# Patient Record
Sex: FEMALE | Race: WHITE | NOT HISPANIC OR LATINO | Employment: FULL TIME | URBAN - METROPOLITAN AREA
[De-identification: names, ages, dates, MRNs, and addresses within clinical notes are randomized per-mention and may not be internally consistent; named-entity substitution may affect disease eponyms.]

---

## 2022-06-23 ENCOUNTER — TRANSFERRED RECORDS (OUTPATIENT)
Dept: HEALTH INFORMATION MANAGEMENT | Facility: CLINIC | Age: 52
End: 2022-06-23

## 2022-06-24 ENCOUNTER — TRANSFERRED RECORDS (OUTPATIENT)
Dept: HEALTH INFORMATION MANAGEMENT | Facility: CLINIC | Age: 52
End: 2022-06-24

## 2022-06-28 ENCOUNTER — MEDICAL CORRESPONDENCE (OUTPATIENT)
Dept: HEALTH INFORMATION MANAGEMENT | Facility: CLINIC | Age: 52
End: 2022-06-28

## 2022-06-29 ENCOUNTER — DOCUMENTATION ONLY (OUTPATIENT)
Dept: ONCOLOGY | Facility: CLINIC | Age: 52
End: 2022-06-29

## 2022-06-29 ENCOUNTER — TRANSCRIBE ORDERS (OUTPATIENT)
Dept: OTHER | Age: 52
End: 2022-06-29

## 2022-06-29 DIAGNOSIS — R89.9 ABNORMAL THYROID BIOPSY: Primary | ICD-10-CM

## 2022-06-29 NOTE — PROGRESS NOTES
Action June 29, 2022 5:24 PM ABT   Action Taken Records from OGI received and sent to HIM for upload

## 2022-06-30 ENCOUNTER — TRANSCRIBE ORDERS (OUTPATIENT)
Dept: OTHER | Age: 52
End: 2022-06-30

## 2022-06-30 DIAGNOSIS — R89.9 ABNORMAL THYROID BIOPSY: Primary | ICD-10-CM

## 2022-07-01 NOTE — TELEPHONE ENCOUNTER
FUTURE VISIT INFORMATION      FUTURE VISIT INFORMATION:    Date: 22    Time: 8:20AM    [place] [facility] [place] INFORMATION:    Referring provider: Kiki Mcqueen MD    Referring providers clinic:  [facility]    Reason for visit/diagnosis  Abnormal thyroid biopsy [R89.9] referred by Dr. Kiki Mcqueen  [facility]    RECORDS REQUESTED FROM:       Clinic name Comments Records Status Imaging Status   [facility] 22 note from Kiki Mcqueen MD Scanned in [facility]  22 US FNA  Scanned in Epic req 22 - PACS   [facility]  22 US Thyroid   10/5/21 MRI BRAIN  Care everywhere re 22 - [facility], Central Laboratory - [address], [place] [address]   Phone: [phone] 22  Right Thyroid  (Case: [id number])    *trackin Care everywhere     Path req 22 2:57PM sent a fax to [place] rad and [facility] rad for images, [facility] for path send out - Amay   22 images received in PACS - Amay

## 2022-07-07 NOTE — TELEPHONE ENCOUNTER
Action July 7, 2022 4:09 PM ABT   Action Taken Slides 06/23/22: [id number] from [facility] received and taken to 5th floor path lab for review

## 2022-07-11 ENCOUNTER — LAB REQUISITION (OUTPATIENT)
Dept: LAB | Facility: CLINIC | Age: 52
End: 2022-07-11
Payer: COMMERCIAL

## 2022-07-11 PROCEDURE — 88321 CONSLTJ&REPRT SLD PREP ELSWR: CPT | Mod: GC | Performed by: PATHOLOGY

## 2022-07-12 LAB
PATH REPORT.COMMENTS IMP SPEC: NORMAL
PATH REPORT.COMMENTS IMP SPEC: NORMAL
PATH REPORT.FINAL DX SPEC: NORMAL
PATH REPORT.GROSS SPEC: NORMAL
PATH REPORT.MICROSCOPIC SPEC OTHER STN: NORMAL
PATH REPORT.RELEVANT HX SPEC: NORMAL
PATH REPORT.RELEVANT HX SPEC: NORMAL
PATH REPORT.SITE OF ORIGIN SPEC: NORMAL

## 2022-07-18 ENCOUNTER — OFFICE VISIT (OUTPATIENT)
Dept: OTOLARYNGOLOGY | Facility: CLINIC | Age: 52
End: 2022-07-18
Payer: COMMERCIAL

## 2022-07-18 ENCOUNTER — PRE VISIT (OUTPATIENT)
Dept: OTOLARYNGOLOGY | Facility: CLINIC | Age: 52
End: 2022-07-18

## 2022-07-18 ENCOUNTER — ANCILLARY PROCEDURE (OUTPATIENT)
Dept: ULTRASOUND IMAGING | Facility: CLINIC | Age: 52
End: 2022-07-18
Attending: OTOLARYNGOLOGY
Payer: COMMERCIAL

## 2022-07-18 VITALS
DIASTOLIC BLOOD PRESSURE: 73 MMHG | HEIGHT: 66 IN | BODY MASS INDEX: 27.32 KG/M2 | WEIGHT: 170 LBS | HEART RATE: 76 BPM | SYSTOLIC BLOOD PRESSURE: 116 MMHG

## 2022-07-18 DIAGNOSIS — C73 MALIGNANT NEOPLASM OF THYROID GLAND (H): ICD-10-CM

## 2022-07-18 DIAGNOSIS — C73 MALIGNANT NEOPLASM OF THYROID GLAND (H): Primary | ICD-10-CM

## 2022-07-18 PROCEDURE — 76536 US EXAM OF HEAD AND NECK: CPT | Mod: GC | Performed by: RADIOLOGY

## 2022-07-18 PROCEDURE — 99205 OFFICE O/P NEW HI 60 MIN: CPT | Mod: 25 | Performed by: OTOLARYNGOLOGY

## 2022-07-18 PROCEDURE — 31575 DIAGNOSTIC LARYNGOSCOPY: CPT | Performed by: OTOLARYNGOLOGY

## 2022-07-18 RX ORDER — LISINOPRIL 10 MG/1
TABLET ORAL
COMMUNITY
Start: 2022-06-20

## 2022-07-18 RX ORDER — METOPROLOL SUCCINATE 50 MG/1
TABLET, EXTENDED RELEASE ORAL
COMMUNITY
Start: 2022-06-20

## 2022-07-18 ASSESSMENT — PAIN SCALES - GENERAL: PAINLEVEL: NO PAIN (0)

## 2022-07-18 NOTE — LETTER
2022       RE: Miriam Beverly  [address]  [facility] [address]     Dear Colleague,    Thank you for referring your patient, Miriam Beverly, to the [facility] [place] [facility]. Please see a copy of my visit note below.    Dear Dr. Mcqueen:    I had the pleasure of meeting Miriam Beverly in consultation today at the [facility] at your request.     History of Present Illness:   Miriam Beverly is a 51 year old woman referred for evaluation of neck mass. The patient saw her Ob/gyn in 2022 and noted a mass of the neck to the right of midline. The mass had been present for about 2 months prior to presentation.    She had a thyroid U/S on 2022 which showed the right lobe measured 5.4 x 3.0 x 2.1 cm, several small cysts up to 1.3 cm, TIRADS 4 nodule measuring 3.5 x 2.6 x 2.1 (report says 8.5 cm but impression indicates 3.5 cm), left lobe measured 5.6 x 1.3 x 1.0 cm with small cysts measuring up to 5 mm.     She had a thyroid biopsy on 2022 which was suspicious for papillary thyroid cancer.    She had a CT scan by cardiology which showed pulmonary nodules. She is seeing primary care for this on Friday.     TSH normal in 2022.    She has seen a surgeon through [facility].  She says that she had a dedicated neck ultrasound performed so it sounds like it was done of the anterior neck and perhaps not the lateral neck.    She is not a fermin. She has no issues with swallowing. She thinks her voice is more raspy. She has no breathing issues.       Past medical history: anxiety, asthma, hypertension, HPV    Past surgical history: breast augmentation, , colposcopy, wisdom tooth extraction (no bleeding or anesthesia issues)    Social history: Works for non-profit. Occasional alcohol use. . Nonsmoker. Live with partner.    Family history: No thyroid cancer  history. Grandmother with breast cancer.    MEDICATIONS:     Current Outpatient Medications   Medication Sig Dispense Refill     lisinopril (ZESTRIL) 10 MG tablet TAKE 1 TABLET(10 MG) BY MOUTH EVERY DAY       metoprolol succinate ER (TOPROL XL) 50 MG 24 hr tablet TAKE 1 TABLET(50 MG) BY MOUTH EVERY DAY         ALLERGIES:  No Known Allergies    HABITS/SOCIAL HISTORY:   Works for non-profit.   Occasional alcohol use.   .   Nonsmoker.   Live with partner.    Social History     Socioeconomic History     Marital status:      Spouse name: Not on file     Number of children: Not on file     Years of education: Not on file     Highest education level: Not on file   Occupational History     Not on file   Tobacco Use     Smoking status: Not on file     Smokeless tobacco: Not on file   Substance and Sexual Activity     Alcohol use: Not on file     Drug use: Not on file     Sexual activity: Not on file   Other Topics Concern     Not on file   Social History Narrative     Not on file     Social Determinants of Health     Financial Resource Strain: Not on file   Food Insecurity: Not on file   Transportation Needs: Not on file   Physical Activity: Not on file   Stress: Not on file   Social Connections: Not on file   Intimate Partner Violence: Not on file   Housing Stability: Not on file       PAST MEDICAL HISTORY: No past medical history on file.     PAST SURGICAL HISTORY: No past surgical history on file.    FAMILY HISTORY:  No family history on file.    REVIEW OF SYSTEMS:  12 point ROS was negative other than the symptoms noted above in the HPI.  Patient Supplied Answers to Review of Systems   ENT ROS 7/14/2022   Constitutional: Weight loss, Appetite change, Unexplained fatigue   Neurology: Dizzy spells, Headache   Psychology: Frequently feeling anxious   Ears, Nose, Throat: Hoarseness   Cardiopulmonary: Cough   Musculoskeletal: Neck pain   Allergy/Immunology: Allergies or hay fever         PHYSICAL  "EXAMINATION:   /73   Pulse 76   Ht 1.676 m (5' 6\")   Wt 77.1 kg (170 lb)   BMI 27.44 kg/m     Appearance:   normal; NAD, age-appropriate appearance, well-developed, normal habitus   Communication:   normal; communicates verbally, normal voice quality   Head/Face:   inspection -  Normal; no scars or visible lesions   Salivary glands -  Normal size, no tenderness, swelling, or palpable masses   Facial strength -  Normal and symmetric   Skin:  normal, no rash   Ears:  auricle (AD) -  normal  EAC (AD) -  normal  TM (AD) -  Normal, no effusion  auricle (AS) -  normal  EAC (AS) -  normal  TM (AS) -  Normal, no effusion  Normal clinical speech reception   Nose:  Ext. inspection -  Normal  Internal Inspection -  Normal mucosa, septum, and turbinates   Nasopharynx:  normal mucosa, no masses   Oral Cavity:  lips -  Normal mucosa, oral competence, and stoma size   Age-appropriate dentition, healthy gingival mucosa   Hard palate, buccal, floor of mouth muc   Oropharynx:  mucosa -  Normal, no lesions  soft palate -  Normal, no lesions, no asymmetry, normal elevation  tonsils -  Normal, no exudates, no abnormal lesions, symmetric  Base of tongue - normal  Vallecula - clear   Hypopharynx:  Normal pyriform sinus and pharyngeal wall mucosa   No pooled secretions    Larynx:  Epiglottis, AE folds, false vocal cords, true vocal cords, arytenoids normal in appearance, bilaterally mobile cords    Neck: No visible mass or asymmetry   thyroid -  Palpable right thyroid nodule about 3-4 cm in size  Normal range of motion   Lymphatic:  no abnormal nodes   Cardiovascular:  warm, pink, well-perfused extremities without swelling, tenderness, or edema   Respiratory:  Normal respiratory effort, no stridor   Neuro/Psych.:  mood/affect -  normal  mental status -  normal        PROCEDURES:   Flexible fiberoptic laryngoscopy: Scope exam was indicated due to papillary thyroid cancer. Verbal consent was obtained. The nasal cavity was prepped " with an aerosolized solution of topical anesthetic and vasoconstrictive agent. The scope was passed through the anterior nasal cavity and advanced. Inspection of the nasopharynx revealed no gross abnormality. The base of tongue and vallecula are normal. The epiglottis, AE folds, false cords, true cords, arytenoids are normal. Inspection of the larynx revealed bilaterally mobile vocal cords. Pyriform sinuses are symmetric. The airway is patent. Procedure tolerated well with no immediate complications noted.    RESULTS REVIEWED:   Reviewed multiple notes from ob/gyn, TSH, U/s report, path report      IMPRESSION AND PLAN:   Miriam Beverly is a 51 year old woman referred for evaluation of a likely papillary thyroid cancer of the right thyroid lobe. The nodule measured 3.5 cm. Given the size she would actually qualify for a hemithyroidectomy versus total thyroidectomy.  She had an ultrasound performed locally and at least on the report had no lymphadenopathy.  However when I discussed it with her it sounds like it did not include the entire lateral neck.  I discussed my preference to do levels I-VII bilaterally when doing the neck ultrasound since that can significantly change our recommendations if there is nikia disease.    We discussed the surgery in detail.   We discussed hemithyroidectomy versus total thyroidectomy.  We discussed the monitoring difficulties that can be created with a hemithyroidectomy and certainly the inability to get radioactive iodine versus need for second surgery if this is required.    I discussed the planned incision.  We discussed the risks of cosmetic changes and scarring.  We discussed the risk of infection.  We discussed the risk of bleeding which could happen at the time of surgery or in delayed fashion.  We discussed bleeding after surgery can result in need for return trip to the operating room and airway risk.  I had a discussion about the risks of surgery.  We discussed temporary  postoperative dysphagia related to manipulation of the strap muscles.  We discussed the risk to the superior laryngeal nerve and impacts on vocal pitch.  We discussed the risks to the recurrent laryngeal nerve with impacts depending on hemithyroidectomy versus total thyroidectomy.  We discussed with that injury to one side can result in dysphonia and dysphagia, whereas injuries in both sides can result in an airway emergency and need for tracheostomy.  We discussed intraoperative nerve monitoring and if the nerve does not stimulate on one side after performing the right side first, we would not proceed with the left side.  We discussed the risks to the parathyroid glands with the difference between the hemithyroidectomy and total thyroidectomy being potential permanent hypocalcemia with the total thyroidectomy.  We discussed the need for postoperative calcium replacement.  I discussed the role of GLORIA drain placement postoperatively.  We discussed potential need for thyroid replacement depending on the surgery that she proceeds with.    She is interested in meeting with endocrinology.  A consult was placed today.  We will repeat her neck ultrasound here.    She has already met with a surgeon at [facility].  We are happy to be involved in her care however she wishes us to be, our contact information was provided if she wishes to proceed with surgery here.    Thank you very much for the opportunity to participate in the care of your patient.      Sarita Miles MD  Otolaryngology- Head & Neck Surgery      This note was dictated with voice recognition software and then edited. Please excuse any unintentional errors.         CC:  Kiki Mcqueen MD  Ob Gyn and Infertility  [address]  [facility] [address]

## 2022-07-18 NOTE — TELEPHONE ENCOUNTER
APPT NOTES: Malignant Neoplasm of Thyroid Gland, Referred by Dr. Sarita Miles    For Cancer Patients: Need the original operative and surgical pathology reports and all imaging reports/images related to the disease (includes all thyroid US, nuclear thyroid and total body scans, PET scans, chest CT reports since prior to the diagnosis ).   APPT DATE: 8/3/2022   NOTES (FOR ALL VISITS) STATUS DETAILS   OFFICE NOTES from referring provider Internal [facility]:  22 - ENT OV with Dr. Miles   OFFICE NOTES from other specialist Care Everywhere / Received [facility]:  22, 22 - OBGYN OV with Dr. Mcqueen    [facility]:  22 - OBGYN OV with Dr. Mcqueen     ED NOTES N/A    OPERATIVE REPORT  (thyroid, pituitary, adrenal, parathyroid)  (All op notes related to diagnoses) N/A    MEDICATION LIST Internal    IMAGING      MRI (BRAIN) Received [facility]:  10/5/21  MRI Brain   CT (HEAD/NECK/CHEST/ABDOMEN) Received [facility]:  22 - CT Heart Calcium   ULTRASOUND (HEAD/NECK)  * Include FNAs Received / [facility]:  22 - US Head/Neck    [facility]:  22 - US Neck  22 - US Thyroid    [facility]:  22 - US Thyroid FNA   LABS     DIABETES: HBGA1C, CREATININE, FASTING LIPIDS, MICROALBUMIN URINE, POTASSIUM, TSH, T4    THYROID: TSH, T4, CBC, THYRODLONULIN, TOTAL T3, FREE T4, CALCITONIN, CEA Received / Care Everywhere OBGYN:  22 - Estradiol  22 - FSH  22 - HBGA1C (noted in 22 OV)  22 - Lipid (noted in 22 OV)  22 - TSH (noted in 22 OV)    [facility]:  21 - TSH   PATHOLOGY REPORTS WITH CASE NUMBER  *Surgical path reports for endocrine organs (ovaries, testes, pancreas, etc) Care Everywhere   [facility]:  22 - Thyroid FNA (Case: [id number])     Records Requested  22    Facility  [facility] Infertility  Fax: [phone]  [facility]  Fax: [phone]   Outcome * 22 12:03 PM Faxed urg req [facility] MG for images to be  pushed to [facility] PACs. - Bernie    * 7/19/22 11:55 AM Images received from CLARE HAMMER and attached to the patient in PACs. Ely Gibbs

## 2022-07-18 NOTE — NURSING NOTE
"Chief Complaint   Patient presents with     Consult     Abnormal thyroid biopsy       Blood pressure 116/73, pulse 76, height 1.676 m (5' 6\"), weight 77.1 kg (170 lb).    Kylee Moore, EMT    "

## 2022-07-19 ENCOUNTER — PREP FOR PROCEDURE (OUTPATIENT)
Dept: OTOLARYNGOLOGY | Facility: CLINIC | Age: 52
End: 2022-07-19

## 2022-07-19 DIAGNOSIS — C73 PAPILLARY THYROID CARCINOMA (H): Primary | ICD-10-CM

## 2022-07-21 DIAGNOSIS — D32.9 MENINGIOMA (H): Primary | ICD-10-CM

## 2022-07-26 ENCOUNTER — TELEPHONE (OUTPATIENT)
Dept: OTOLARYNGOLOGY | Facility: CLINIC | Age: 52
End: 2022-07-26

## 2022-07-26 NOTE — TELEPHONE ENCOUNTER
Called patient to schedule surgery with Dr. Miles. Patient was offered dates in August. Patient elected to hold off to complete her work deadlines. Patient elected for surgery 3rd week of august and was scheduled for 8/18/2022.    Approximate arrival time given:  No  Location of surgery: [facility] OR  Pre-Op H&P: PCP within 30 days   Post-Op Appt Date: 1 week    Imaging needed:  No  Discussed COVID-19 Testing: Yes     Patient aware that pre-op RN will call 2-3 days prior to surgery with arrival time and instructions Yes     Packet sent out: Yes 07/26/22 - Thyroidectomy teaching was mailed.     All patients questions were answered and was instructed to review surgical packet and call back with any questions or concerns. Knows to call JOSE ALEJANDRO Cortez for any questions or concerns.       Greta Barnes on 7/26/2022 at 11:29 AM

## 2022-07-27 DIAGNOSIS — C73 PAPILLARY THYROID CARCINOMA (H): Primary | ICD-10-CM

## 2022-07-27 DIAGNOSIS — R91.8 PULMONARY NODULES: ICD-10-CM

## 2022-07-31 ENCOUNTER — HEALTH MAINTENANCE LETTER (OUTPATIENT)
Age: 52
End: 2022-07-31

## 2022-08-03 ENCOUNTER — OFFICE VISIT (OUTPATIENT)
Dept: ENDOCRINOLOGY | Facility: CLINIC | Age: 52
End: 2022-08-03
Attending: OTOLARYNGOLOGY
Payer: COMMERCIAL

## 2022-08-03 ENCOUNTER — PRE VISIT (OUTPATIENT)
Dept: ENDOCRINOLOGY | Facility: CLINIC | Age: 52
End: 2022-08-03

## 2022-08-03 VITALS
TEMPERATURE: 97.9 F | BODY MASS INDEX: 27.76 KG/M2 | DIASTOLIC BLOOD PRESSURE: 69 MMHG | SYSTOLIC BLOOD PRESSURE: 100 MMHG | OXYGEN SATURATION: 96 % | RESPIRATION RATE: 18 BRPM | HEART RATE: 83 BPM | WEIGHT: 172 LBS

## 2022-08-03 DIAGNOSIS — C73 MALIGNANT NEOPLASM OF THYROID GLAND (H): ICD-10-CM

## 2022-08-03 DIAGNOSIS — R89.6 ABNORMAL CYTOLOGY: ICD-10-CM

## 2022-08-03 DIAGNOSIS — R91.8 PULMONARY NODULES: ICD-10-CM

## 2022-08-03 DIAGNOSIS — E04.1 THYROID NODULE: Primary | ICD-10-CM

## 2022-08-03 PROCEDURE — 99205 OFFICE O/P NEW HI 60 MIN: CPT

## 2022-08-03 RX ORDER — MULTIVITAMIN
1 TABLET ORAL
COMMUNITY

## 2022-08-03 RX ORDER — LORATADINE 10 MG/1
10 TABLET ORAL DAILY
COMMUNITY

## 2022-08-03 ASSESSMENT — PAIN SCALES - GENERAL: PAINLEVEL: NO PAIN (0)

## 2022-08-03 NOTE — CONFIDENTIAL NOTE
RECORDS STATUS - ALL OTHER DIAGNOSIS      RECORDS RECEIVED FROM: [facility]/ [facility]/ [facility]    DATE RECEIVED: 8/9/2022    Action    Action Taken 8/3/2022 9:39AM KEB   I called [facility]'s IMG Dept Ph: [phone] - CT Chest/Thorax     I called [facility] Ph: [phone] - they will push the CT from July 27th 2022    I called [facility] - they will push the older images to  PACS    I resolved all the images in PACS      NOTES STATUS DETAILS   OFFICE NOTE from referring provider     DISCHARGE SUMMARY from hospital Complete Epic- 8/18/2022    More in [facility]   DISCHARGE REPORT from the ER     OPERATIVE REPORT Complete 7/19/2022 THYROIDECTOMY     MEDICATION LIST Complete  [facility]   CLINICAL TRIAL TREATMENTS TO DATE     LABS     PATHOLOGY REPORTS  7/11/2022 Path Consult   CASE FROM [place], [place] ([id number], OBTAINED 06/23/2022):     Specimen A                  Interpretation:                    Suspicious for papillary thyroid carcinoma      ANYTHING RELATED TO DIAGNOSIS     GENONOMIC TESTING     TYPE:     IMAGING (NEED IMAGES & REPORT)     CT SCANS Complete - [facility]     Complete- [facility]  CT Chest/ Thorax 7/27/2022    CT Heart 6/24/2022     Older imaging in PACS   MRI     MAMMO     ULTRASOUND Complete- [facility] US Neck Soft Tissue 7/13/2022     US Thyroid 6/16/2022   PET

## 2022-08-03 NOTE — PROGRESS NOTES
Endocrine Consult note--     Attending Assessment/Plan :     1.  Thyroid nodules  A) right dominant 3.5 cm nodule with cytology suspicious for papillary thyroid carcinoma. This cytology carries approx 50-85% risk of thyroid cancer, not 100%.  Molecular testing  has not been performed  B) subcm right and left nodules  She has already seen 2 surgeons and is scheduled for surgery with Dr Miles 8/18.   I have conseled her on the above , as well as considerations as it pertains to # 3 and 4.   RTC 8/29     2.  Abnormal cytology suspicious for papillary thyroid carcinoma    4.  Pulmonary nodules noted right lung 6/2022 and 7/22 imaging    4.  Brain masses  A) extraaxial mass presumed meningioma  B) superior cerebellar cistern mass presumed arachnoid cyst    _62_ minutes spent on the date of the encounter doing chart review, history and exam, documentation and further activities as noted above.    Cheyenne Valladares MD    Chief complaint:  Miriam is a 51 year old female seen in consultation at the request of Dr Sarita Miles for papillary thyroid carcinoma.   I have reviewed Care Everywhere including [facility] lab reports, imaging reports and provider notes as indicated.      HISTORY OF PRESENT ILLNESS  Right thyroid enlargement was felt on examination 9/29/2021.  This was later followed by thyroid US and FNAB of the right dominant nodule measuring 3.5 cm.  Cytology was read at both [facility] and [facility] both as suspicious for papillary thyroid carcinoma.   Molecular testing  has not been performed and the fNAB cytology report from [facility] suggests it wasn't collected for prn molecular     She is scheduled for total thyroidctomy 8/10/2022 with Dr Laura Woods. She also saw surgeon Dr Sarita Miles 7/18/2022.  I have reviewed her note.    Surgery is 8/18/2022    Endocrine relevant labs are as follows:  5/16/13 TSH 1.24  9/29/2021 TSh 1.45    Relevant imaging is as follows: (as read by me as it pertains to endocrine  relevant organs)  10/5/2021 brain MRI: pituitary appears normal ; T1 hyperintense mass left  described by radiologist as Homogeneous enhancing extra-axial mass along the left temporal convexity in the middle cranial fossa 11 x 19 x  14 mm, in keeping with a benign meningioma. Mild mass effect on the lateral temporal gyri; I don' see the cerebellar cistern cyst.    22 thyroid US ([facility]):  Right thyroid nodule 2.6 x 2.1 x  3.5 cm hypoechoic heterogeneous -2022 FNAB  Right medial inferior  0.7 x 0.6x 1.2 cystic with small mural solid  Right inf medial  0.8 x 0.5 x 0.9 mixed   Left inferior mid posterior  0.3 x  0.2 x 0.4   Left posterior mid 0.3 x 0.3 x 0.5 hypoechoic  2022 neck US [facility]   2022 neck US  Right level 5 inferior # 1 0.7 x   Left level 3 # 1 0.3 x 0.2 x   Left level 3 # 2 0.4 x 0.6 x 1.1   2022 CT chest ([facility])    REVIEW OF SYSTEMS  Fatigued  Lack of appetite x 2-3 months  Weight loss 20# since 3-6 months ago  Cardiac: occasional heart racing feeling; PVCs have been pretty good for a while.   Respiratory: negative  GI nausea with periods  Anxious person  Stopped ocp 2021- now having periods every 3 weeks   10 system ROS otherwise as per the HPI or negative    Past Medical History  Past Medical History:   Diagnosis Date     Allergy-induced asthma      Arachnoid cyst of posterior cranial fossa 10/05/2021    3 cm,superior cerebellar cistern; presumptive imaging diagnosis     Gestational hypertension      HTN (hypertension)      Hyperlipidemia      Meningioma (H) 10/05/2021    presumptive imaging diagnsois; 14 mm , left temporal convexity     Postpartum cardiomyopathy      Preeclampsia      Pulmonary nodules 2022     PVC's (premature ventricular contractions)      Sinusitis 2017     Past Surgical History:   Procedure Laterality Date      SECTION  2002     MAMMOPLASTY AUGMENTATION  2012     wisdom teeth extraction        Medications  Current Outpatient Medications   Medication     lisinopril (ZESTRIL) 10 MG tablet     loratadine (CLEAR-ATADINE) 10 MG tablet     metoprolol succinate ER (TOPROL XL) 50 MG 24 hr tablet     Multiple Vitamin (ONE-A-DAY ESSENTIAL) TABS     No current facility-administered medications for this visit.       Current Outpatient Medications   Medication Sig Dispense Refill     lisinopril (ZESTRIL) 10 MG tablet TAKE 1 TABLET(10 MG) BY MOUTH EVERY DAY       loratadine (CLEAR-ATADINE) 10 MG tablet Take 10 mg by mouth daily       metoprolol succinate ER (TOPROL XL) 50 MG 24 hr tablet TAKE 1 TABLET(50 MG) BY MOUTH EVERY DAY       Multiple Vitamin (ONE-A-DAY ESSENTIAL) TABS Take 1 tablet by mouth       Allergies  No Known Allergies    Family History  Family History   Problem Relation Age of Onset     Hypertension Mother      Hypertension Father      Breast Cancer Paternal Grandmother      Diabetes Paternal Aunt      Diabetes Paternal Aunt      Thyroid Cancer No family hx of      Thyroid Disease No family hx of      Social History  Social History     Tobacco Use     Smoking status: Never Smoker     Smokeless tobacco: Never Used   Substance Use Topics     Alcohol use: Yes     Comment: occ     Drug use: Never   walks everyday;     Physical Exam  /69   Pulse 83   Temp 97.9  F (36.6  C)   Resp 18   Wt 78 kg (172 lb)   SpO2 96%   BMI 27.76 kg/m    Body mass index is 27.76 kg/m .  GENERAL : pleasant woman in  In no apparent distress  SKIN: Normal color, normal temperature, texture.  .     EYES: PER,  No scleral icterus,  No proptosis, conjunctival redness, stare, retraction  NECK: visible right thyroid bed fullness mass effect approx 3.5 cm.  On palpation if feels smaller, hard, irregular approx 2 ccm   RESP: Lungs clear to auscultation bilaterally  CARDIAC: Regular rate and rhythm, normal S1 S2, without murmurs, rubs or gallops   NEURO: awake, alert, responds appropriately to questions.    Moves all  extremities; Gait normal.  No tremor of the outstretched hand.  DTRs  2 /4 ,   EXTREMITIES: No clubbing, cyanosis or edema.    DATA REVIEW    EXAMINATION: US HEAD NECK SOFT TISSUE 7/18/2022 9:55 AM   COMPARISON: None.  HISTORY: Papillary thyroid cancer assess bilateral neck 1 through 7   TECHNIQUE: Sonographic imaging performed of the neck to evaluate forlymph nodes using grey scale and limited Doppler technique.  FINDINGS:  Lymph nodes are measured bilaterally with measurements given intransverse, AP, and craniocaudal dimensions as follows:  Right:  Level 1: Benign appearing node measuring 0.8 x 0.5 x 0.7 cm  Level 2: Benign-appearing nodes measuring 1.9 x 0.9 x 3.2 cm and 1 x0.3 x 1 cm  Level 5: Benign-appearing node measuring 0.5 x 0.6 x 0.7 cm  Left:  Level 2: Benign-appearing node measuring 1.4 x 0.8 x 2.3 cm  Level 3: Benign-appearing lymph nodes measuring 0.3 x 0.2 x 0.3 cm and 0.6 x 0.4 x 1.1 cm  Partially imaged previously biopsied right thyroid nodule.   IMPRESSION:  Soft tissue neck ultrasound with lymph node measurements as describedabove.  MANNY THOMSON MD        Pathology Consult: [id number]  Order: [id number]   Collected 7/11/2022  1:22 PM     Status: Final result     Visible to patient: Yes (seen)     0 Result Notes    Component    Case Report   Consult Report                                    Case: [id number]                                   Authorizing Provider:  Sarita Miles MD     Collected:           07/11/2022 01:22 PM           Ordering Location:     [facility]     Received:            07/11/2022 01:23 PM                                  [facility]                                                         Pathologist:           Corby Moreira III, MD                                                       Specimen:    Consult Slide, [id number]                                                                  Final Diagnosis   CASE FROM [facility],  [place], [place] ([id number], OBTAINED 06/23/2022):     Specimen A                   Interpretation:                    Suspicious for papillary thyroid carcinoma                Adequacy:                   Satisfactory for evaluation      Electronically signed by Corby Moreira III, MD on 7/12/2022 at 12:24 PM   Original Case ID    [id number]   Material Submitted    11 slides   Clinical Information    The patient is a 51-year-old female with a right mid thyroid nodule (3.5cm)      Gross Description    Received from [facility] in [place], [place] are 11 stained slides labeled [id number] (obtained 06/23/2022), and copies of the referring pathologist's reports with patient identifying information.  All slides are returned.      Microscopic Description    Microscopic examination was performed.     A resident/fellow in an ACGME accredited training program was involved in the initial review, preparation, and/or interpretation of this case.  I, as the senior physician, attest that I: (i) reviewed patient clinical records if indicated; (ii) reviewed relevant lab test results; (iii) examined the relevant preparation(s) for the specimen(s); and (iv) agree with the report, diagnosis(es), and interpretation as documented by the resident/fellow and edited/confirmed by me. Reporting resident/fellow: Rafal Calvillo, PGY5      Performing Labs    The technical component of this testing was completed at [facility]   [facility]              Specimen Collected: 07/11/22  1:22 PM Last Resulted: 07/12/22 12:24 PM                7/27/2022 CT CHEST/THORAX W/O CON    Anatomical Region Laterality Modality   Chest -- Computed Tomography       Impression    IMPRESSION: Redemonstration of multiple clustered nodular and tubular opacities at the extreme anterior/inferior right lung base, grossly similar to the study from one month ago. I suspect this  represents a combination of lung nodules and mucous impacted bronchi. This appearance is more commonly seen with an infectious/inflammatory process than a neoplastic process. Since these have not resolved or decreased, consider pulmonary consultation for further evaluation.   REPORT SIGNED BY DR. Reilly Camacho  Narrative  EXAM: CT CHEST (without contrast)     DATE: 7/27/2022 7:30 AM   COMPARISON: 6/20/2022   CLINICAL DATA: Lung nodules.   ADDITIONAL CLINICAL DATA: R91.1 Solitary pulmonary nodule   TECHNIQUE: A routine unenhanced CT scan of the thorax was performed.  Specifically, thin-section contiguous transaxial images were obtained through the thorax.  Coronal reformations were also obtained through the thorax.  No intravenous contrast was given.   FINDINGS:   Redemonstration of multiple clustered nodular and tubular opacities at the extreme anterior/inferior right lung base, grossly similar to the study from one month ago. No new lung nodules are seen. No acute lung infiltrate. Limited assessment of the mediastinal, hilar, and vascular structures without intravenous contrast. No enlarged thoracic lymph nodes are seen. No pleural effusion. Bilateral breast implants are noted.    EXAM: MRI BRAIN W/O&W CON  DATE: 10/5/2021 8:11 AM   CLINICAL DATA: .   ICD 10:  R20.2 Paresthesia of skin   COMPARISON: None.  TECHNIQUE: Sagittal FLAIR T1, axial FLAIR, axial fat-saturated FSE T2, axial gradient echo, axial DWI, axial T1 , post gadolinium axial and coronal T1.   8.5 cc of Gadavist intravenously administered.   FINDINGS:  Homogeneous enhancing extra-axial mass along the left temporal convexity in the middle cranial fossa measures 11 x 19 mm transaxially by 14 mm craniocaudally, in keeping with a benign meningioma. Mild mass effect on the lateral temporal gyri, without adjacent parenchymal edema, is noted.  Focal area of CSF signal intensity on all sequences at the superior cerebellar cistern is in keeping with an  incidental arachnoid cyst, measuring approximately 3 cm long axis. Clinical significance is doubtful.   Elsewhere, no restricted diffusion, gradient signal abnormality or other mass is seen. No other abnormal intracranial enhancement is noted. Ventricles and extra-axial spaces are normal in size.     Visualized paranasal sinuses are clear.  Mastoid air cells are well aerated.   The cervicomedullary junction is unremarkable.    The dural sinuses are patent.  Normal intravascular flow voids noted at the skull base.  Procedure Note  Rob Adler MD - 10/05/2021

## 2022-08-03 NOTE — LETTER
8/3/2022       RE: Miriam Beverly  [address]  [facility] [address]     Dear Colleague,    Thank you for referring your patient, Miriam Beverly, to the [facility] [place] at [facility]. Please see a copy of my visit note below.    Endocrine Consult note--     Attending Assessment/Plan :     1.  Thyroid nodules  A) right dominant 3.5 cm nodule with cytology suspicious for papillary thyroid carcinoma. This cytology carries approx 50-85% risk of thyroid cancer, not 100%.  Molecular testing  has not been performed  B) subcm right and left nodules  She has already seen 2 surgeons and is scheduled for surgery with Dr Miles 8/18.   I have conseled her on the above , as well as considerations as it pertains to # 3 and 4.   RTC 8/29     2.  Abnormal cytology suspicious for papillary thyroid carcinoma    4.  Pulmonary nodules noted right lung 6/2022 and 7/22 imaging    4.  Brain masses  A) extraaxial mass presumed meningioma  B) superior cerebellar cistern mass presumed arachnoid cyst    _62_ minutes spent on the date of the encounter doing chart review, history and exam, documentation and further activities as noted above.    Cheyenne Valladares MD    Chief complaint:  Miriam is a 51 year old female seen in consultation at the request of Dr Sarita Miles for papillary thyroid carcinoma.   I have reviewed Care Everywhere including [facility] lab reports, imaging reports and provider notes as indicated.      HISTORY OF PRESENT ILLNESS  Right thyroid enlargement was felt on examination 9/29/2021.  This was later followed by thyroid US and FNAB of the right dominant nodule measuring 3.5 cm.  Cytology was read at both [facility] and [facility] both as suspicious for papillary thyroid carcinoma.   Molecular testing  has not been performed and the fNAB cytology report from [facility] suggests it wasn't collected for prn molecular     She is  scheduled for total thyroidctomy 8/10/2022 with Dr Laura Woods. She also saw surgeon Dr Sarita Miles 7/18/2022.  I have reviewed her note.    Surgery is 8/18/2022    Endocrine relevant labs are as follows:  5/16/13 TSH 1.24  9/29/2021 TSh 1.45    Relevant imaging is as follows: (as read by me as it pertains to endocrine relevant organs)  10/5/2021 brain MRI: pituitary appears normal ; T1 hyperintense mass left  described by radiologist as Homogeneous enhancing extra-axial mass along the left temporal convexity in the middle cranial fossa 11 x 19 x  14 mm, in keeping with a benign meningioma. Mild mass effect on the lateral temporal gyri; I don' see the cerebellar cistern cyst.    6/16/22 thyroid US ([facility]):  Right thyroid nodule 2.6 x 2.1 x  3.5 cm hypoechoic heterogeneous -6/23/2022 FNAB  Right medial inferior  0.7 x 0.6x 1.2 cystic with small mural solid  Right inf medial  0.8 x 0.5 x 0.9 mixed   Left inferior mid posterior  0.3 x  0.2 x 0.4   Left posterior mid 0.3 x 0.3 x 0.5 hypoechoic  7/13/2022 neck US [facility]   7/18/2022 neck US  Right level 5 inferior # 1 0.7 x   Left level 3 # 1 0.3 x 0.2 x   Left level 3 # 2 0.4 x 0.6 x 1.1   7/27/2022 CT chest ([facility])    REVIEW OF SYSTEMS  Fatigued  Lack of appetite x 2-3 months  Weight loss 20# since 3-6 months ago  Cardiac: occasional heart racing feeling; PVCs have been pretty good for a while.   Respiratory: negative  GI nausea with periods  Anxious person  Stopped ocp 12/2021- now having periods every 3 weeks   10 system ROS otherwise as per the HPI or negative    Past Medical History  Past Medical History:   Diagnosis Date     Allergy-induced asthma      Arachnoid cyst of posterior cranial fossa 10/05/2021    3 cm,superior cerebellar cistern; presumptive imaging diagnosis     Gestational hypertension      HTN (hypertension)      Hyperlipidemia      Meningioma (H) 10/05/2021    presumptive imaging diagnsois; 14 mm , left temporal convexity      Postpartum cardiomyopathy      Preeclampsia      Pulmonary nodules 2022     PVC's (premature ventricular contractions)      Sinusitis 2017     Past Surgical History:   Procedure Laterality Date      SECTION       MAMMOPLASTY AUGMENTATION  2012     wisdom teeth extraction       Medications  Current Outpatient Medications   Medication     lisinopril (ZESTRIL) 10 MG tablet     loratadine (CLEAR-ATADINE) 10 MG tablet     metoprolol succinate ER (TOPROL XL) 50 MG 24 hr tablet     Multiple Vitamin (ONE-A-DAY ESSENTIAL) TABS     No current facility-administered medications for this visit.       Current Outpatient Medications   Medication Sig Dispense Refill     lisinopril (ZESTRIL) 10 MG tablet TAKE 1 TABLET(10 MG) BY MOUTH EVERY DAY       loratadine (CLEAR-ATADINE) 10 MG tablet Take 10 mg by mouth daily       metoprolol succinate ER (TOPROL XL) 50 MG 24 hr tablet TAKE 1 TABLET(50 MG) BY MOUTH EVERY DAY       Multiple Vitamin (ONE-A-DAY ESSENTIAL) TABS Take 1 tablet by mouth       Allergies  No Known Allergies    Family History  Family History   Problem Relation Age of Onset     Hypertension Mother      Hypertension Father      Breast Cancer Paternal Grandmother      Diabetes Paternal Aunt      Diabetes Paternal Aunt      Thyroid Cancer No family hx of      Thyroid Disease No family hx of      Social History  Social History     Tobacco Use     Smoking status: Never Smoker     Smokeless tobacco: Never Used   Substance Use Topics     Alcohol use: Yes     Comment: occ     Drug use: Never   walks everyday;     Physical Exam  /69   Pulse 83   Temp 97.9  F (36.6  C)   Resp 18   Wt 78 kg (172 lb)   SpO2 96%   BMI 27.76 kg/m    Body mass index is 27.76 kg/m .  GENERAL : pleasant woman in  In no apparent distress  SKIN: Normal color, normal temperature, texture.  .     EYES: PER,  No scleral icterus,  No proptosis, conjunctival redness, stare, retraction  NECK: visible right thyroid bed  fullness mass effect approx 3.5 cm.  On palpation if feels smaller, hard, irregular approx 2 ccm   RESP: Lungs clear to auscultation bilaterally  CARDIAC: Regular rate and rhythm, normal S1 S2, without murmurs, rubs or gallops   NEURO: awake, alert, responds appropriately to questions.    Moves all extremities; Gait normal.  No tremor of the outstretched hand.  DTRs  2 /4 ,   EXTREMITIES: No clubbing, cyanosis or edema.    DATA REVIEW    EXAMINATION: US HEAD NECK SOFT TISSUE 7/18/2022 9:55 AM   COMPARISON: None.  HISTORY: Papillary thyroid cancer assess bilateral neck 1 through 7   TECHNIQUE: Sonographic imaging performed of the neck to evaluate forlymph nodes using grey scale and limited Doppler technique.  FINDINGS:  Lymph nodes are measured bilaterally with measurements given intransverse, AP, and craniocaudal dimensions as follows:  Right:  Level 1: Benign appearing node measuring 0.8 x 0.5 x 0.7 cm  Level 2: Benign-appearing nodes measuring 1.9 x 0.9 x 3.2 cm and 1 x0.3 x 1 cm  Level 5: Benign-appearing node measuring 0.5 x 0.6 x 0.7 cm  Left:  Level 2: Benign-appearing node measuring 1.4 x 0.8 x 2.3 cm  Level 3: Benign-appearing lymph nodes measuring 0.3 x 0.2 x 0.3 cm and 0.6 x 0.4 x 1.1 cm  Partially imaged previously biopsied right thyroid nodule.   IMPRESSION:  Soft tissue neck ultrasound with lymph node measurements as describedabove.  MANNY THOMSON MD        Pathology Consult: [id number]  Order: [id number]   Collected 7/11/2022  1:22 PM     Status: Final result     Visible to patient: Yes (seen)     0 Result Notes    Component    Case Report   Consult Report                                    Case: [id number]                                   Authorizing Provider:  Sarita Miles MD     Collected:           07/11/2022 01:22 PM           Ordering Location:     [facility]     Received:            07/11/2022 01:23 PM                                  [facility] Diagnostics                                                          Pathologist:           Corby Moreira III, MD                                                       Specimen:    Consult Slide, [id number]                                                                  Final Diagnosis   CASE FROM [facility], [place], [place] ([id number], OBTAINED 06/23/2022):     Specimen A                   Interpretation:                    Suspicious for papillary thyroid carcinoma                Adequacy:                   Satisfactory for evaluation      Electronically signed by Corby Moreira III, MD on 7/12/2022 at 12:24 PM   Original Case ID    [id number]   Material Submitted    11 slides   Clinical Information    The patient is a 51-year-old female with a right mid thyroid nodule (3.5cm)      Gross Description    Received from [facility] in [place], [place] are 11 stained slides labeled [id number] (obtained 06/23/2022), and copies of the referring pathologist's reports with patient identifying information.  All slides are returned.      Microscopic Description    Microscopic examination was performed.     A resident/fellow in an ACGME accredited training program was involved in the initial review, preparation, and/or interpretation of this case.  I, as the senior physician, attest that I: (i) reviewed patient clinical records if indicated; (ii) reviewed relevant lab test results; (iii) examined the relevant preparation(s) for the specimen(s); and (iv) agree with the report, diagnosis(es), and interpretation as documented by the resident/fellow and edited/confirmed by me. Reporting resident/fellow: Rafal Calvillo, PGY5      Performing Labs    The technical component of this testing was completed at [facility]   Resulting Agency [facility]              Specimen Collected: 07/11/22  1:22 PM Last Resulted: 07/12/22 12:24 PM                7/27/2022 CT  CHEST/THORAX W/O CON    Anatomical Region Laterality Modality   Chest -- Computed Tomography       Impression    IMPRESSION: Redemonstration of multiple clustered nodular and tubular opacities at the extreme anterior/inferior right lung base, grossly similar to the study from one month ago. I suspect this represents a combination of lung nodules and mucous impacted bronchi. This appearance is more commonly seen with an infectious/inflammatory process than a neoplastic process. Since these have not resolved or decreased, consider pulmonary consultation for further evaluation.   REPORT SIGNED BY DR. Reilly Camacho  Narrative  EXAM: CT CHEST (without contrast)     DATE: 7/27/2022 7:30 AM   COMPARISON: 6/20/2022   CLINICAL DATA: Lung nodules.   ADDITIONAL CLINICAL DATA: R91.1 Solitary pulmonary nodule   TECHNIQUE: A routine unenhanced CT scan of the thorax was performed.  Specifically, thin-section contiguous transaxial images were obtained through the thorax.  Coronal reformations were also obtained through the thorax.  No intravenous contrast was given.   FINDINGS:   Redemonstration of multiple clustered nodular and tubular opacities at the extreme anterior/inferior right lung base, grossly similar to the study from one month ago. No new lung nodules are seen. No acute lung infiltrate. Limited assessment of the mediastinal, hilar, and vascular structures without intravenous contrast. No enlarged thoracic lymph nodes are seen. No pleural effusion. Bilateral breast implants are noted.    EXAM: MRI BRAIN W/O&W CON  DATE: 10/5/2021 8:11 AM   CLINICAL DATA: .   ICD 10:  R20.2 Paresthesia of skin   COMPARISON: None.  TECHNIQUE: Sagittal FLAIR T1, axial FLAIR, axial fat-saturated FSE T2, axial gradient echo, axial DWI, axial T1 , post gadolinium axial and coronal T1.   8.5 cc of Gadavist intravenously administered.   FINDINGS:  Homogeneous enhancing extra-axial mass along the left temporal convexity in the middle cranial  fossa measures 11 x 19 mm transaxially by 14 mm craniocaudally, in keeping with a benign meningioma. Mild mass effect on the lateral temporal gyri, without adjacent parenchymal edema, is noted.  Focal area of CSF signal intensity on all sequences at the superior cerebellar cistern is in keeping with an incidental arachnoid cyst, measuring approximately 3 cm long axis. Clinical significance is doubtful.   Elsewhere, no restricted diffusion, gradient signal abnormality or other mass is seen. No other abnormal intracranial enhancement is noted. Ventricles and extra-axial spaces are normal in size.     Visualized paranasal sinuses are clear.  Mastoid air cells are well aerated.   The cervicomedullary junction is unremarkable.    The dural sinuses are patent.  Normal intravascular flow voids noted at the skull base.  Procedure Note  Rob Adler MD - 10/05/2021

## 2022-08-09 ENCOUNTER — VIRTUAL VISIT (OUTPATIENT)
Dept: PULMONOLOGY | Facility: CLINIC | Age: 52
End: 2022-08-09
Attending: OTOLARYNGOLOGY
Payer: COMMERCIAL

## 2022-08-09 ENCOUNTER — PRE VISIT (OUTPATIENT)
Dept: PULMONOLOGY | Facility: CLINIC | Age: 52
End: 2022-08-09

## 2022-08-09 DIAGNOSIS — R91.8 PULMONARY NODULES: ICD-10-CM

## 2022-08-09 DIAGNOSIS — C73 PAPILLARY THYROID CARCINOMA (H): ICD-10-CM

## 2022-08-09 PROCEDURE — G0463 HOSPITAL OUTPT CLINIC VISIT: HCPCS | Mod: PN,RTG | Performed by: INTERNAL MEDICINE

## 2022-08-09 PROCEDURE — 99204 OFFICE O/P NEW MOD 45 MIN: CPT | Mod: 95 | Performed by: INTERNAL MEDICINE

## 2022-08-09 NOTE — PROGRESS NOTES
LUNG NODULE & INTERVENTIONAL PULMONARY CLINIC  CLINICS & SURGERY CENTER  [facility]      August 9, 2022            Patient: Miriam Beverly        Assessment:   Miriam Beverly is a 51 year old seen in clinic for right lower lobe lung nodules.  I reviewed the CT scan which is uploaded to PACS.  It appears to me that this is more of scarring from an old infection rather than malignancy.  In the setting of papillary thyroid cancer however, I cannot be certain.  I recommend the following.    Plan:    Will discuss with patient's surgeon for upcoming full thyroidectomy; I would like to perform bronchoscopy with BAL in the RLL and send for cytology during her surgery next Thursday(8/18) as she will already be under general anesthesia.     I will also discuss this case at our tumor board on Friday.     Patient seen and discussed with Dr. Josué Heller  Interventional Pulmonary Fellow               Chief Complaint: Lung nodule    History of Present Illness:   Miriam Beverly is a 51 year old presents for consultation regarding a lung nodule.  Patient was performing a self thyroid exam in April 2022.  At that time she noticed a lump on her thyroid.  She went to see a doctor for this.  Patient had biopsy of her thyroid.  Cytology was suspicious for papillary thyroid carcinoma.  Patient is scheduled for total thyroidectomy on 8/10/2022.  As part of her work-up she had a CT chest obtained by cardiology which showed right lower lobe pulmonary nodules.  Additionally she had a brain imaging which revealed a extra-axial mass presumed meningioma.  Patient is seeing us today for her right lower lobe pulmonary nodules.  Patient denies any infectious type symptoms including fevers, chills, cough, shortness of breath.  She has no prior history of bronchitis or pneumonia.  She is otherwise relatively healthy and has a past medical history of hypertension, asthma, anxiety.  She is a never smoker.  No  family history of any thyroid cancer.         Data:   All pertinent laboratory and imaging data reviewed.           Past Medical History:     Past Medical History:   Diagnosis Date     Allergy-induced asthma      Arachnoid cyst of posterior cranial fossa 10/05/2021    3 cm,superior cerebellar cistern; presumptive imaging diagnosis     Gestational hypertension      HTN (hypertension)      Hyperlipidemia      Meningioma (H) 10/05/2021    presumptive imaging diagnsois; 14 mm , left temporal convexity     Postpartum cardiomyopathy      Preeclampsia      Pulmonary nodules 2022     PVC's (premature ventricular contractions)      Sinusitis 2017            Past Surgical History:     Past Surgical History:   Procedure Laterality Date      SECTION  2002     MAMMOPLASTY AUGMENTATION  2012     wisdom teeth extraction              Social History:     Social History     Socioeconomic History     Marital status:      Spouse name: Not on file     Number of children: Not on file     Years of education: Not on file     Highest education level: Not on file   Occupational History     Not on file   Tobacco Use     Smoking status: Never Smoker     Smokeless tobacco: Never Used   Substance and Sexual Activity     Alcohol use: Yes     Comment: occ     Drug use: Never     Sexual activity: Not on file   Other Topics Concern     Not on file   Social History Narrative     Not on file     Social Determinants of Health     Financial Resource Strain: Not on file   Food Insecurity: Not on file   Transportation Needs: Not on file   Physical Activity: Not on file   Stress: Not on file   Social Connections: Not on file   Intimate Partner Violence: Not on file   Housing Stability: Not on file              Family History:     Family History   Problem Relation Age of Onset     Hypertension Mother      Hypertension Father      Breast Cancer Paternal Grandmother      Diabetes Paternal Aunt      Diabetes Paternal Aunt       Thyroid Cancer No family hx of      Thyroid Disease No family hx of             Allergies:   No Known Allergies         Medications:     Current Outpatient Medications   Medication     lisinopril (ZESTRIL) 10 MG tablet     loratadine (CLARITIN) 10 MG tablet     metoprolol succinate ER (TOPROL XL) 50 MG 24 hr tablet     Multiple Vitamin (ONE-A-DAY ESSENTIAL) TABS     No current facility-administered medications for this visit.            Review of Systems:   A 12 point review of systems is negative aside for as noted above         Physical Exam:   Virtual video visit. Unable to obtain full exam  Gen:Appears well. Able to talk in full sentences.   HEENT: conjunctiva white  Neuro: Alert, able to answer questions

## 2022-08-09 NOTE — LETTER
8/9/2022       RE: Miriam Beverly  [address]  [facility] [address]     Dear Colleague,    Thank you for referring your patient, Miriam Beverly, to the [facility]CER [facility]INIC at [facility]. Please see a copy of my visit note below.    Miriam is a 51 year old who is being evaluated via a billable video visit.      How would you like to obtain your AVS? MyChart  If the video visit is dropped, the invitation should be resent by: Send to e-mail at: [email or web address]  Will anyone else be joining your video visit? Vivienne CANTU          Video-Visit Details    Video Start Time: 130    Type of service:  Video Visit    Video End Time:145    Originating Location (pt. Location): Home    Distant Location (provider location):  [facility] CANCER [facility]     Platform used for Video Visit: [facility]        LUNG NODULE & INTERVENTIONAL PULMONARY CLINIC  CLINICS & SURGERY CENTER  [facility]      August 9, 2022            Patient: Miriam Beverly        Assessment:   Miriam Beverly is a 51 year old seen in clinic for right lower lobe lung nodules.  I reviewed the CT scan which is uploaded to PACS.  It appears to me that this is more of scarring from an old infection rather than malignancy.  In the setting of papillary thyroid cancer however, I cannot be certain.  I recommend the following.    Plan:    Will discuss with patient's surgeon for upcoming full thyroidectomy; I would like to perform bronchoscopy with BAL in the RLL and send for cytology during her surgery next Thursday(8/18) as she will already be under general anesthesia.     I will also discuss this case at our tumor board on Friday.     Patient seen and discussed with Dr. Josué Heller  Interventional Pulmonary Fellow               Chief Complaint: Lung nodule    History of Present Illness:   Miriam Beverly is a 51 year old presents for  consultation regarding a lung nodule.  Patient was performing a self thyroid exam in 2022.  At that time she noticed a lump on her thyroid.  She went to see a doctor for this.  Patient had biopsy of her thyroid.  Cytology was suspicious for papillary thyroid carcinoma.  Patient is scheduled for total thyroidectomy on 8/10/2022.  As part of her work-up she had a CT chest obtained by cardiology which showed right lower lobe pulmonary nodules.  Additionally she had a brain imaging which revealed a extra-axial mass presumed meningioma.  Patient is seeing us today for her right lower lobe pulmonary nodules.  Patient denies any infectious type symptoms including fevers, chills, cough, shortness of breath.  She has no prior history of bronchitis or pneumonia.  She is otherwise relatively healthy and has a past medical history of hypertension, asthma, anxiety.  She is a never smoker.  No family history of any thyroid cancer.         Data:   All pertinent laboratory and imaging data reviewed.           Past Medical History:     Past Medical History:   Diagnosis Date     Allergy-induced asthma      Arachnoid cyst of posterior cranial fossa 10/05/2021    3 cm,superior cerebellar cistern; presumptive imaging diagnosis     Gestational hypertension      HTN (hypertension)      Hyperlipidemia      Meningioma (H) 10/05/2021    presumptive imaging diagnsois; 14 mm , left temporal convexity     Postpartum cardiomyopathy      Preeclampsia      Pulmonary nodules 2022     PVC's (premature ventricular contractions)      Sinusitis 2017            Past Surgical History:     Past Surgical History:   Procedure Laterality Date      SECTION       MAMMOPLASTY AUGMENTATION  2012     wisdom teeth extraction              Social History:     Social History     Socioeconomic History     Marital status:      Spouse name: Not on file     Number of children: Not on file     Years of education: Not on file      Highest education level: Not on file   Occupational History     Not on file   Tobacco Use     Smoking status: Never Smoker     Smokeless tobacco: Never Used   Substance and Sexual Activity     Alcohol use: Yes     Comment: occ     Drug use: Never     Sexual activity: Not on file   Other Topics Concern     Not on file   Social History Narrative     Not on file     Social Determinants of Health     Financial Resource Strain: Not on file   Food Insecurity: Not on file   Transportation Needs: Not on file   Physical Activity: Not on file   Stress: Not on file   Social Connections: Not on file   Intimate Partner Violence: Not on file   Housing Stability: Not on file              Family History:     Family History   Problem Relation Age of Onset     Hypertension Mother      Hypertension Father      Breast Cancer Paternal Grandmother      Diabetes Paternal Aunt      Diabetes Paternal Aunt      Thyroid Cancer No family hx of      Thyroid Disease No family hx of             Allergies:   No Known Allergies         Medications:     Current Outpatient Medications   Medication     lisinopril (ZESTRIL) 10 MG tablet     loratadine (CLARITIN) 10 MG tablet     metoprolol succinate ER (TOPROL XL) 50 MG 24 hr tablet     Multiple Vitamin (ONE-A-DAY ESSENTIAL) TABS     No current facility-administered medications for this visit.            Review of Systems:   A 12 point review of systems is negative aside for as noted above         Physical Exam:   Virtual video visit. Unable to obtain full exam  Gen:Appears well. Able to talk in full sentences.   HEENT: conjunctiva white  Neuro: Alert, able to answer questions             Attestation signed by Kenneth Falcon MD at 8/10/2022 12:34 PM:  Physician Attestation   IKenneth MD, saw this patient with the resident and agree with the resident/fellow's findings and plan of care as documented in the note.      I personally reviewed vital signs, medications, labs, and imaging.    Key  findings: nodules in the setting of thyroid cancer.  No symptoms at all.    Will review at nodule conference.  Consider bronch during her thyroid surgery but will discuss first.    Kenneth Falcon MD  Date of Service (when I saw the patient): 8/9/22

## 2022-08-09 NOTE — PROGRESS NOTES
Miriam is a 51 year old who is being evaluated via a billable video visit.      How would you like to obtain your AVS? MyChart  If the video visit is dropped, the invitation should be resent by: Send to e-mail at: [email or web address]  Will anyone else be joining your video visit? Vivienne CANTU          Video-Visit Details    Video Start Time: 130    Type of service:  Video Visit    Video End Time:145    Originating Location (pt. Location): Home    Distant Location (provider location):  [facility] CANCER [facility]     Platform used for Video Visit: [employer]

## 2022-08-10 ENCOUNTER — ANCILLARY PROCEDURE (OUTPATIENT)
Dept: MRI IMAGING | Facility: CLINIC | Age: 52
End: 2022-08-10
Attending: NEUROLOGICAL SURGERY
Payer: COMMERCIAL

## 2022-08-10 DIAGNOSIS — D32.9 MENINGIOMA (H): ICD-10-CM

## 2022-08-10 PROCEDURE — 70553 MRI BRAIN STEM W/O & W/DYE: CPT | Mod: GC | Performed by: RADIOLOGY

## 2022-08-10 PROCEDURE — A9585 GADOBUTROL INJECTION: HCPCS | Performed by: RADIOLOGY

## 2022-08-10 RX ORDER — GADOBUTROL 604.72 MG/ML
10 INJECTION INTRAVENOUS ONCE
Status: COMPLETED | OUTPATIENT
Start: 2022-08-10 | End: 2022-08-10

## 2022-08-10 RX ADMIN — GADOBUTROL 8 ML: 604.72 INJECTION INTRAVENOUS at 18:22

## 2022-08-12 ENCOUNTER — TEAM CONFERENCE (OUTPATIENT)
Dept: PULMONOLOGY | Facility: CLINIC | Age: 52
End: 2022-08-12

## 2022-08-12 ENCOUNTER — TELEPHONE (OUTPATIENT)
Dept: NEUROSURGERY | Facility: CLINIC | Age: 52
End: 2022-08-12

## 2022-08-13 NOTE — TELEPHONE ENCOUNTER
FUTURE VISIT INFORMATION      FUTURE VISIT INFORMATION:    Date: 8/31/2022    Time: 930am    [place] [facility] [place]ERRAL INFORMATION:    Referring provider:      Referring providers clinic:      Reason for visit/diagnosis      RECORDS REQUESTED FROM:       Clinic name Comments Records Status Imaging Status   INternal MR Brain 8/10/2022    US Head/Neck-7/18/2022 [facility] PACS         [facility]  MR Brain-10/5/2021 Care Everywhere Requested                        8/22/2022-Spoke with [facility] Radiology to have images pushed ASAP-MR @ 1144am     8/30/2022-[facility] Images now in PACS-MR @ 616am

## 2022-08-15 ENCOUNTER — PATIENT OUTREACH (OUTPATIENT)
Dept: PULMONOLOGY | Facility: CLINIC | Age: 52
End: 2022-08-15

## 2022-08-15 NOTE — TELEPHONE ENCOUNTER
LVM for patient to return call.    Lucila Galvan RN Care Coordinator   Interventional Pulmonology  Phone: [phone]  Fax: [phone]

## 2022-08-15 NOTE — TELEPHONE ENCOUNTER
Patient returned call.    Advised patient that Dr. Falcon will not join Dr. Miles in the OR this week during her procedure.  He wanted her to know her nodules are very low risk.    Patient verbalized understanding, but would like to know next steps.    Will consult Dr. Falcon and let the patient know after that.

## 2022-08-16 ENCOUNTER — LAB (OUTPATIENT)
Dept: LAB | Facility: CLINIC | Age: 52
End: 2022-08-16
Attending: FAMILY MEDICINE
Payer: COMMERCIAL

## 2022-08-16 DIAGNOSIS — Z01.818 PRE-OPERATIVE GENERAL PHYSICAL EXAMINATION: ICD-10-CM

## 2022-08-16 LAB — SARS-COV-2 RNA RESP QL NAA+PROBE: NEGATIVE

## 2022-08-16 PROCEDURE — U0005 INFEC AGEN DETEC AMPLI PROBE: HCPCS

## 2022-08-16 PROCEDURE — U0003 INFECTIOUS AGENT DETECTION BY NUCLEIC ACID (DNA OR RNA); SEVERE ACUTE RESPIRATORY SYNDROME CORONAVIRUS 2 (SARS-COV-2) (CORONAVIRUS DISEASE [COVID-19]), AMPLIFIED PROBE TECHNIQUE, MAKING USE OF HIGH THROUGHPUT TECHNOLOGIES AS DESCRIBED BY CMS-2020-01-R: HCPCS

## 2022-08-18 ENCOUNTER — ANESTHESIA (OUTPATIENT)
Dept: SURGERY | Facility: CLINIC | Age: 52
End: 2022-08-18
Payer: COMMERCIAL

## 2022-08-18 ENCOUNTER — HOSPITAL ENCOUNTER (OUTPATIENT)
Facility: CLINIC | Age: 52
Discharge: HOME | End: 2022-08-18
Attending: OTOLARYNGOLOGY | Admitting: OTOLARYNGOLOGY
Payer: COMMERCIAL

## 2022-08-18 ENCOUNTER — ANESTHESIA EVENT (OUTPATIENT)
Dept: SURGERY | Facility: CLINIC | Age: 52
End: 2022-08-18
Payer: COMMERCIAL

## 2022-08-18 VITALS
HEART RATE: 76 BPM | RESPIRATION RATE: 15 BRPM | HEIGHT: 66 IN | BODY MASS INDEX: 27 KG/M2 | SYSTOLIC BLOOD PRESSURE: 130 MMHG | TEMPERATURE: 98.8 F | DIASTOLIC BLOOD PRESSURE: 83 MMHG | WEIGHT: 167.99 LBS | OXYGEN SATURATION: 100 %

## 2022-08-18 DIAGNOSIS — Z98.890 POST-OPERATIVE STATE: ICD-10-CM

## 2022-08-18 DIAGNOSIS — E04.1 THYROID NODULE: Primary | ICD-10-CM

## 2022-08-18 LAB
CA-I BLD-MCNC: 4.6 MG/DL (ref 4.4–5.2)
CREAT SERPL-MCNC: 1.07 MG/DL (ref 0.51–0.95)
GFR SERPL CREATININE-BSD FRML MDRD: 63 ML/MIN/1.73M2
GLUCOSE BLDC GLUCOMTR-MCNC: 95 MG/DL (ref 70–99)
HOLD SPECIMEN: NORMAL
POTASSIUM SERPL-SCNC: 3.7 MMOL/L (ref 3.4–5.3)
PTH-INTACT SERPL-MCNC: 11 PG/ML (ref 15–65)

## 2022-08-18 PROCEDURE — 250N000009 HC RX 250: Performed by: OTOLARYNGOLOGY

## 2022-08-18 PROCEDURE — 88342 IMHCHEM/IMCYTCHM 1ST ANTB: CPT | Mod: 26 | Performed by: PATHOLOGY

## 2022-08-18 PROCEDURE — 88342 IMHCHEM/IMCYTCHM 1ST ANTB: CPT | Mod: TC | Performed by: OTOLARYNGOLOGY

## 2022-08-18 PROCEDURE — 88307 TISSUE EXAM BY PATHOLOGIST: CPT | Mod: TC | Performed by: OTOLARYNGOLOGY

## 2022-08-18 PROCEDURE — 82330 ASSAY OF CALCIUM: CPT | Performed by: STUDENT IN AN ORGANIZED HEALTH CARE EDUCATION/TRAINING PROGRAM

## 2022-08-18 PROCEDURE — 88307 TISSUE EXAM BY PATHOLOGIST: CPT | Mod: 26 | Performed by: PATHOLOGY

## 2022-08-18 PROCEDURE — 250N000011 HC RX IP 250 OP 636: Performed by: NURSE ANESTHETIST, CERTIFIED REGISTERED

## 2022-08-18 PROCEDURE — 258N000003 HC RX IP 258 OP 636: Performed by: NURSE ANESTHETIST, CERTIFIED REGISTERED

## 2022-08-18 PROCEDURE — 84132 ASSAY OF SERUM POTASSIUM: CPT | Performed by: ANESTHESIOLOGY

## 2022-08-18 PROCEDURE — 272N000001 HC OR GENERAL SUPPLY STERILE: Performed by: OTOLARYNGOLOGY

## 2022-08-18 PROCEDURE — 250N000025 HC SEVOFLURANE, PER MIN: Performed by: OTOLARYNGOLOGY

## 2022-08-18 PROCEDURE — 250N000011 HC RX IP 250 OP 636: Performed by: ANESTHESIOLOGY

## 2022-08-18 PROCEDURE — 83970 ASSAY OF PARATHORMONE: CPT | Performed by: STUDENT IN AN ORGANIZED HEALTH CARE EDUCATION/TRAINING PROGRAM

## 2022-08-18 PROCEDURE — 250N000009 HC RX 250: Performed by: NURSE ANESTHETIST, CERTIFIED REGISTERED

## 2022-08-18 PROCEDURE — 370N000017 HC ANESTHESIA TECHNICAL FEE, PER MIN: Performed by: OTOLARYNGOLOGY

## 2022-08-18 PROCEDURE — 360N000077 HC SURGERY LEVEL 4, PER MIN: Performed by: OTOLARYNGOLOGY

## 2022-08-18 PROCEDURE — 82962 GLUCOSE BLOOD TEST: CPT

## 2022-08-18 PROCEDURE — 250N000013 HC RX MED GY IP 250 OP 250 PS 637: Performed by: ANESTHESIOLOGY

## 2022-08-18 PROCEDURE — 36415 COLL VENOUS BLD VENIPUNCTURE: CPT | Performed by: ANESTHESIOLOGY

## 2022-08-18 PROCEDURE — 999N000141 HC STATISTIC PRE-PROCEDURE NURSING ASSESSMENT: Performed by: OTOLARYNGOLOGY

## 2022-08-18 PROCEDURE — 82565 ASSAY OF CREATININE: CPT | Performed by: ANESTHESIOLOGY

## 2022-08-18 PROCEDURE — 36415 COLL VENOUS BLD VENIPUNCTURE: CPT | Performed by: STUDENT IN AN ORGANIZED HEALTH CARE EDUCATION/TRAINING PROGRAM

## 2022-08-18 PROCEDURE — 250N000011 HC RX IP 250 OP 636: Performed by: OTOLARYNGOLOGY

## 2022-08-18 PROCEDURE — 710N000012 HC RECOVERY PHASE 2, PER MINUTE: Performed by: OTOLARYNGOLOGY

## 2022-08-18 PROCEDURE — 710N000010 HC RECOVERY PHASE 1, LEVEL 2, PER MIN: Performed by: OTOLARYNGOLOGY

## 2022-08-18 PROCEDURE — 88341 IMHCHEM/IMCYTCHM EA ADD ANTB: CPT | Mod: 26 | Performed by: PATHOLOGY

## 2022-08-18 PROCEDURE — 60240 REMOVAL OF THYROID: CPT | Mod: GC | Performed by: OTOLARYNGOLOGY

## 2022-08-18 RX ORDER — ACETAMINOPHEN 325 MG/1
650 TABLET ORAL
Status: DISCONTINUED | OUTPATIENT
Start: 2022-08-18 | End: 2022-08-18 | Stop reason: HOSPADM

## 2022-08-18 RX ORDER — DEXAMETHASONE SODIUM PHOSPHATE 10 MG/ML
10 INJECTION, SOLUTION INTRAMUSCULAR; INTRAVENOUS ONCE
Status: DISCONTINUED | OUTPATIENT
Start: 2022-08-18 | End: 2022-08-18 | Stop reason: HOSPADM

## 2022-08-18 RX ORDER — NALOXONE HYDROCHLORIDE 0.4 MG/ML
0.2 INJECTION, SOLUTION INTRAMUSCULAR; INTRAVENOUS; SUBCUTANEOUS
Status: DISCONTINUED | OUTPATIENT
Start: 2022-08-18 | End: 2022-08-18 | Stop reason: HOSPADM

## 2022-08-18 RX ORDER — ONDANSETRON 4 MG/1
4 TABLET, ORALLY DISINTEGRATING ORAL EVERY 8 HOURS PRN
Qty: 4 TABLET | Refills: 0 | Status: SHIPPED | OUTPATIENT
Start: 2022-08-18 | End: 2022-08-29

## 2022-08-18 RX ORDER — NALOXONE HYDROCHLORIDE 0.4 MG/ML
0.4 INJECTION, SOLUTION INTRAMUSCULAR; INTRAVENOUS; SUBCUTANEOUS
Status: DISCONTINUED | OUTPATIENT
Start: 2022-08-18 | End: 2022-08-18 | Stop reason: HOSPADM

## 2022-08-18 RX ORDER — HYDROMORPHONE HYDROCHLORIDE 1 MG/ML
0.2 INJECTION, SOLUTION INTRAMUSCULAR; INTRAVENOUS; SUBCUTANEOUS EVERY 5 MIN PRN
Status: DISCONTINUED | OUTPATIENT
Start: 2022-08-18 | End: 2022-08-18 | Stop reason: HOSPADM

## 2022-08-18 RX ORDER — OXYCODONE HYDROCHLORIDE 5 MG/1
5-10 TABLET ORAL EVERY 6 HOURS PRN
Qty: 15 TABLET | Refills: 0 | Status: SHIPPED | OUTPATIENT
Start: 2022-08-18 | End: 2022-08-29

## 2022-08-18 RX ORDER — CALCIUM CARBONATE 500 MG/1
TABLET, CHEWABLE ORAL
Qty: 210 TABLET | Refills: 0 | Status: SHIPPED | OUTPATIENT
Start: 2022-08-18 | End: 2022-09-08

## 2022-08-18 RX ORDER — CEFAZOLIN SODIUM/WATER 2 G/20 ML
2 SYRINGE (ML) INTRAVENOUS
Status: COMPLETED | OUTPATIENT
Start: 2022-08-18 | End: 2022-08-18

## 2022-08-18 RX ORDER — FENTANYL CITRATE 50 UG/ML
INJECTION, SOLUTION INTRAMUSCULAR; INTRAVENOUS PRN
Status: DISCONTINUED | OUTPATIENT
Start: 2022-08-18 | End: 2022-08-18

## 2022-08-18 RX ORDER — CALCITRIOL 0.25 UG/1
0.25 CAPSULE, LIQUID FILLED ORAL 2 TIMES DAILY
Qty: 42 CAPSULE | Refills: 0 | Status: SHIPPED | OUTPATIENT
Start: 2022-08-18 | End: 2022-08-29

## 2022-08-18 RX ORDER — OXYCODONE HYDROCHLORIDE 5 MG/1
5 TABLET ORAL EVERY 4 HOURS PRN
Status: DISCONTINUED | OUTPATIENT
Start: 2022-08-18 | End: 2022-08-18 | Stop reason: HOSPADM

## 2022-08-18 RX ORDER — EPHEDRINE SULFATE 50 MG/ML
INJECTION, SOLUTION INTRAMUSCULAR; INTRAVENOUS; SUBCUTANEOUS PRN
Status: DISCONTINUED | OUTPATIENT
Start: 2022-08-18 | End: 2022-08-18

## 2022-08-18 RX ORDER — CEFAZOLIN SODIUM/WATER 2 G/20 ML
2 SYRINGE (ML) INTRAVENOUS SEE ADMIN INSTRUCTIONS
Status: DISCONTINUED | OUTPATIENT
Start: 2022-08-18 | End: 2022-08-18 | Stop reason: HOSPADM

## 2022-08-18 RX ORDER — SODIUM CHLORIDE, SODIUM LACTATE, POTASSIUM CHLORIDE, CALCIUM CHLORIDE 600; 310; 30; 20 MG/100ML; MG/100ML; MG/100ML; MG/100ML
INJECTION, SOLUTION INTRAVENOUS CONTINUOUS
Status: DISCONTINUED | OUTPATIENT
Start: 2022-08-18 | End: 2022-08-18 | Stop reason: HOSPADM

## 2022-08-18 RX ORDER — AMOXICILLIN 250 MG
1-2 CAPSULE ORAL 2 TIMES DAILY
Qty: 30 TABLET | Refills: 0 | Status: SHIPPED | OUTPATIENT
Start: 2022-08-18 | End: 2022-08-29

## 2022-08-18 RX ORDER — OXYCODONE HYDROCHLORIDE 5 MG/1
5 TABLET ORAL
Status: DISCONTINUED | OUTPATIENT
Start: 2022-08-18 | End: 2022-08-18 | Stop reason: HOSPADM

## 2022-08-18 RX ORDER — ONDANSETRON 2 MG/ML
INJECTION INTRAMUSCULAR; INTRAVENOUS PRN
Status: DISCONTINUED | OUTPATIENT
Start: 2022-08-18 | End: 2022-08-18

## 2022-08-18 RX ORDER — LEVOTHYROXINE SODIUM 125 UG/1
125 TABLET ORAL DAILY
Qty: 60 TABLET | Refills: 0 | Status: SHIPPED | OUTPATIENT
Start: 2022-08-19 | End: 2022-09-26

## 2022-08-18 RX ORDER — DEXAMETHASONE SODIUM PHOSPHATE 4 MG/ML
INJECTION, SOLUTION INTRA-ARTICULAR; INTRALESIONAL; INTRAMUSCULAR; INTRAVENOUS; SOFT TISSUE PRN
Status: DISCONTINUED | OUTPATIENT
Start: 2022-08-18 | End: 2022-08-18

## 2022-08-18 RX ORDER — ACETAMINOPHEN 325 MG/1
650 TABLET ORAL EVERY 4 HOURS PRN
Qty: 50 TABLET | Refills: 0 | Status: SHIPPED | OUTPATIENT
Start: 2022-08-18 | End: 2022-08-29

## 2022-08-18 RX ORDER — LIDOCAINE 40 MG/G
CREAM TOPICAL
Status: DISCONTINUED | OUTPATIENT
Start: 2022-08-18 | End: 2022-08-18 | Stop reason: HOSPADM

## 2022-08-18 RX ORDER — ONDANSETRON 2 MG/ML
4 INJECTION INTRAMUSCULAR; INTRAVENOUS EVERY 30 MIN PRN
Status: DISCONTINUED | OUTPATIENT
Start: 2022-08-18 | End: 2022-08-18 | Stop reason: HOSPADM

## 2022-08-18 RX ORDER — FENTANYL CITRATE 50 UG/ML
25 INJECTION, SOLUTION INTRAMUSCULAR; INTRAVENOUS EVERY 5 MIN PRN
Status: DISCONTINUED | OUTPATIENT
Start: 2022-08-18 | End: 2022-08-18 | Stop reason: HOSPADM

## 2022-08-18 RX ORDER — ONDANSETRON 2 MG/ML
4 INJECTION INTRAMUSCULAR; INTRAVENOUS EVERY 6 HOURS PRN
Status: DISCONTINUED | OUTPATIENT
Start: 2022-08-18 | End: 2022-08-18 | Stop reason: HOSPADM

## 2022-08-18 RX ORDER — LIDOCAINE HYDROCHLORIDE 20 MG/ML
INJECTION, SOLUTION INFILTRATION; PERINEURAL PRN
Status: DISCONTINUED | OUTPATIENT
Start: 2022-08-18 | End: 2022-08-18

## 2022-08-18 RX ORDER — ONDANSETRON 4 MG/1
4 TABLET, ORALLY DISINTEGRATING ORAL EVERY 30 MIN PRN
Status: DISCONTINUED | OUTPATIENT
Start: 2022-08-18 | End: 2022-08-18 | Stop reason: HOSPADM

## 2022-08-18 RX ORDER — PROPOFOL 10 MG/ML
INJECTION, EMULSION INTRAVENOUS PRN
Status: DISCONTINUED | OUTPATIENT
Start: 2022-08-18 | End: 2022-08-18

## 2022-08-18 RX ORDER — LIDOCAINE HYDROCHLORIDE AND EPINEPHRINE 10; 10 MG/ML; UG/ML
INJECTION, SOLUTION INFILTRATION; PERINEURAL PRN
Status: DISCONTINUED | OUTPATIENT
Start: 2022-08-18 | End: 2022-08-18 | Stop reason: HOSPADM

## 2022-08-18 RX ORDER — SODIUM CHLORIDE, SODIUM LACTATE, POTASSIUM CHLORIDE, CALCIUM CHLORIDE 600; 310; 30; 20 MG/100ML; MG/100ML; MG/100ML; MG/100ML
INJECTION, SOLUTION INTRAVENOUS CONTINUOUS PRN
Status: DISCONTINUED | OUTPATIENT
Start: 2022-08-18 | End: 2022-08-18

## 2022-08-18 RX ADMIN — REMIFENTANIL HYDROCHLORIDE 0.1 MCG/KG/MIN: 1 INJECTION, POWDER, LYOPHILIZED, FOR SOLUTION INTRAVENOUS at 08:01

## 2022-08-18 RX ADMIN — SODIUM CHLORIDE, POTASSIUM CHLORIDE, SODIUM LACTATE AND CALCIUM CHLORIDE: 600; 310; 30; 20 INJECTION, SOLUTION INTRAVENOUS at 07:26

## 2022-08-18 RX ADMIN — ONDANSETRON 4 MG: 2 INJECTION INTRAMUSCULAR; INTRAVENOUS at 07:42

## 2022-08-18 RX ADMIN — ONDANSETRON 4 MG: 2 INJECTION INTRAMUSCULAR; INTRAVENOUS at 12:27

## 2022-08-18 RX ADMIN — SUCCINYLCHOLINE CHLORIDE 120 MG: 20 INJECTION, SOLUTION INTRAMUSCULAR; INTRAVENOUS; PARENTERAL at 07:34

## 2022-08-18 RX ADMIN — PHENYLEPHRINE HYDROCHLORIDE 100 MCG: 10 INJECTION INTRAVENOUS at 07:53

## 2022-08-18 RX ADMIN — SODIUM CHLORIDE, POTASSIUM CHLORIDE, SODIUM LACTATE AND CALCIUM CHLORIDE: 600; 310; 30; 20 INJECTION, SOLUTION INTRAVENOUS at 09:00

## 2022-08-18 RX ADMIN — REMIFENTANIL HYDROCHLORIDE 0.05 MCG/KG/MIN: 1 INJECTION, POWDER, LYOPHILIZED, FOR SOLUTION INTRAVENOUS at 07:36

## 2022-08-18 RX ADMIN — HYDROMORPHONE HYDROCHLORIDE 0.5 MG: 1 INJECTION, SOLUTION INTRAMUSCULAR; INTRAVENOUS; SUBCUTANEOUS at 10:07

## 2022-08-18 RX ADMIN — Medication 2 G: at 07:35

## 2022-08-18 RX ADMIN — LIDOCAINE HYDROCHLORIDE 100 MG: 20 INJECTION, SOLUTION INFILTRATION; PERINEURAL at 07:34

## 2022-08-18 RX ADMIN — HYDROMORPHONE HYDROCHLORIDE 0.2 MG: 1 INJECTION, SOLUTION INTRAMUSCULAR; INTRAVENOUS; SUBCUTANEOUS at 11:09

## 2022-08-18 RX ADMIN — OXYCODONE HYDROCHLORIDE 5 MG: 5 TABLET ORAL at 11:19

## 2022-08-18 RX ADMIN — PROPOFOL 50 MG: 10 INJECTION, EMULSION INTRAVENOUS at 08:06

## 2022-08-18 RX ADMIN — MIDAZOLAM 2 MG: 1 INJECTION INTRAMUSCULAR; INTRAVENOUS at 07:26

## 2022-08-18 RX ADMIN — SODIUM CHLORIDE, POTASSIUM CHLORIDE, SODIUM LACTATE AND CALCIUM CHLORIDE: 600; 310; 30; 20 INJECTION, SOLUTION INTRAVENOUS at 08:00

## 2022-08-18 RX ADMIN — PROPOFOL 150 MG: 10 INJECTION, EMULSION INTRAVENOUS at 07:34

## 2022-08-18 RX ADMIN — FENTANYL CITRATE 100 MCG: 50 INJECTION, SOLUTION INTRAMUSCULAR; INTRAVENOUS at 07:34

## 2022-08-18 RX ADMIN — DEXAMETHASONE SODIUM PHOSPHATE 10 MG: 4 INJECTION, SOLUTION INTRA-ARTICULAR; INTRALESIONAL; INTRAMUSCULAR; INTRAVENOUS; SOFT TISSUE at 07:43

## 2022-08-18 RX ADMIN — FENTANYL CITRATE 25 MCG: 50 INJECTION, SOLUTION INTRAMUSCULAR; INTRAVENOUS at 10:55

## 2022-08-18 RX ADMIN — ONDANSETRON 4 MG: 2 INJECTION INTRAMUSCULAR; INTRAVENOUS at 10:46

## 2022-08-18 RX ADMIN — HYDROMORPHONE HYDROCHLORIDE 0.2 MG: 1 INJECTION, SOLUTION INTRAMUSCULAR; INTRAVENOUS; SUBCUTANEOUS at 11:02

## 2022-08-18 RX ADMIN — Medication 10 MG: at 08:59

## 2022-08-18 RX ADMIN — FENTANYL CITRATE 25 MCG: 50 INJECTION, SOLUTION INTRAMUSCULAR; INTRAVENOUS at 10:48

## 2022-08-18 ASSESSMENT — ACTIVITIES OF DAILY LIVING (ADL)
ADLS_ACUITY_SCORE: 35

## 2022-08-18 NOTE — ANESTHESIA CARE TRANSFER NOTE
Patient: Miriam Beverly    Procedure: Procedure(s):  THYROIDECTOMY, TOTAL       Diagnosis: Papillary thyroid carcinoma (H) [C73]  Diagnosis Additional Information: No value filed.    Anesthesia Type:   General     Note:    Oropharynx: oropharynx clear of all foreign objects and spontaneously breathing  Level of Consciousness: awake  Oxygen Supplementation: nasal cannula  Level of Supplemental Oxygen (L/min / FiO2): 3  Independent Airway: airway patency satisfactory and stable  Dentition: dentition unchanged  Vital Signs Stable: post-procedure vital signs reviewed and stable  Report to RN Given: handoff report given  Patient transferred to: PACU    Handoff Report: Identifed the Patient, Identified the Reponsible Provider, Reviewed the pertinent medical history, Discussed the surgical course, Reviewed Intra-OP anesthesia mangement and issues during anesthesia, Set expectations for post-procedure period and Allowed opportunity for questions and acknowledgement of understanding      Vitals:  Vitals Value Taken Time   BP     Temp     Pulse     Resp 15 08/18/22 1033   SpO2 99 % 08/18/22 1033   Vitals shown include unvalidated device data.    Electronically Signed By: DELIA Underwood CRNA  August 18, 2022  10:33 AM

## 2022-08-18 NOTE — BRIEF OP NOTE
[facility]    Brief Operative Note    Pre-operative diagnosis: Papillary thyroid carcinoma (H) [C73]  Post-operative diagnosis Same as pre-operative diagnosis    Procedure: Procedure(s):  THYROIDECTOMY, TOTAL  Surgeon: Surgeon(s) and Role:     * Sarita Miles MD - Primary     * Ayana Aguiar MD - Resident - Assisting  Anesthesia: General   Estimated Blood Loss: 20cc    Drains: Brigido-Roca  Specimens:   ID Type Source Tests Collected by Time Destination   1 : Delphian Node and Pretracheal level 6 Tissue Other SURGICAL PATHOLOGY EXAM Sarita Miles MD 8/18/2022  8:09 AM    2 : Total thyroidectomy Stitch on Right side  Tissue Other SURGICAL PATHOLOGY EXAM Sarita Miles MD 8/18/2022  9:43 AM      Findings:   total thyroidectomy; palpable right thyroid nodule. Bilatereal RLN visualized, preserved and stimulated at end of case..  Complications: None.  Implants: * No implants in log *

## 2022-08-18 NOTE — OP NOTE
Date of Procedure: 8/18/2022    Attending Physician: Sarita Miles MD    Resident Physicians: Ayah Aguiar MD    Procedure Performed:  Total thyroidectomy    Preoperative Diagnosis: papillary thyroid cancer    Postoperative Diagnosis: same    Anesthesia: General    Blood loss: 20 cc    Specimens:   ID Type Source Tests Collected by Time Destination   1 : Delphian Node and Pretracheal level 6 Tissue Other SURGICAL PATHOLOGY EXAM Sarita Miles MD 8/18/2022  8:09 AM     2 : Total thyroidectomy Stitch on Right side  Tissue Other SURGICAL PATHOLOGY EXAM Sarita Miles MD 8/18/2022  9:43 AM          Implants:  GLORIA drain x 1    Complications: None    Findings:  Nodule of right thyroid lobe, no obvious extrathyroidal extension  Preservation of bilateral recurrent laryngeal nerves - stimulated at end of case at 0.5 mA with movement on monitor and on palpation of the PCA muscle  All 4 parathyroid glands identified and preserved    Indications:  Miriam Beverly is a 51 year old woman with a likely papillary thyroid cancer. She had a thyroid U/S on 6/16/2022 which showed the right lobe measured 5.4 x 3.0 x 2.1 cm, several small cysts up to 1.3 cm, TIRADS 4 nodule measuring 3.5 x 2.6 x 2.1 (report says 8.5 cm but impression indicates 3.5 cm), left lobe measured 5.6 x 1.3 x 1.0 cm with small cysts measuring up to 5 mm.   She had a thyroid biopsy on 6/23/2022 which was suspicious for papillary thyroid cancer. She has elected to proceed with total thyroidectomy.     Description of Procedure:  After informed consent was obtained, the patient was brought back to the main operating room and placed in a supine position.  A shoulder roll was placed. General anesthesia was induced and the patient was orotracheally intubated with a NIMS endotracheal tube using the [facility]. The planned incision was marked at approximately the level of the cricoid cartilage. The planned incision was injected with 1% lidocaine with 1:100,000  epinephrine. The patient was then prepped and draped in usual fashion.  A timeout was performed.     A 15 blade was used to make an incision through the skin. The monopolar cautery was used to divide the platysma. Skin flaps were raised superiorly to the thyroid notch and inferiorly to the sternal notch. The midline raphae was identified between the strap muscles and divided. The delphian node present between the strap muscles was removed and handed off to nursing for permanent pathology. The sternohyoid muscle was identified and retracted laterally on the right.  Blunt dissection was performed to release the sternothyroid muscle off of the anterior surface of the thyroid gland.  Kitner dissection was performed along the anterior and lateral borders of the thyroid gland so that the entire surface could be viewed.  The dissection on the right thyroid lobe was started at the junction of the thyroid gland along the cricothyroid muscle.  The Gee dissector was used to release the thyroid gland along the thyroid cartilage and working along the superior pole.  The superior pole vessels were taken down, taking care to clip and divide the pedicle.  During the dissection along the superior pole as the gland was retracted medially the superior parathyroid gland was identified and preserved.  Dissection was carried along the thyroid capsule working from superior to inferior along the lateral border.  We then continued to release along the inferior aspect of the gland.  Care was taken to clip the vessels near the gland. The inferior parathyroid gland was identified and left in place. The right thyroid lobe was retracted medially towards the trachea. Blunt dissection was performed with the Gee dissector in order to identify the recurrent laryngeal nerve.  This was traced towards the cricothyroid joint.  The identity of the nerve was verified using the nerve stimulator.   With the nerve in view the remainder of the right  side of the gland was able to be released along the lateral border of the trachea. The right thyroid lobe was then released off the trachea at Lockett's ligament.  The recurrent laryngeal nerve was tested at 0.5 mA with stimulation noted on the monitor and on palpation of the PCA muscle.       We then turned our attention to the left side of the thyroid gland.  The sternohyoid muscle was released from the sternothyroid muscle and retracted laterally.  The fascia along the medial border of the sternothyroid was released and the sternothyroid muscle was bluntly dissected off the anterior surface of the thyroid gland.  Kitners were used to dissect along the borders of the thyroid lobe on the left side out towards the carotid sheath and towards the superior pole.  The thyroid isthmus was released off the anterior surface of the trachea using the bipolar cautery. We then started to use the Gee dissector to release the thyroid gland at its junction with the cricothyroid muscle.  We continued to dissect along the superior pole, taking down the superior pedicle. The superior parathyroid gland was identified and preserved.  Blunt dissection was then performed with the Gee dissector and bipolar cautery to release the lateral border of the thyroid gland.  The inferior pole was taken down near the gland.  The left thyroid lobe was retracted medially towards the trachea. The inferior parathyroid gland was identified and preserved.  At this point the Gee dissector was used to bluntly dissect to identify the recurrent laryngeal nerve.  The identity of the nerve was verified using the nerve stimulator.  This was traced towards the cricothyroid joint. The remainder of the thyroid gland was released off the lateral border of the trachea.  Once the gland was completely released it was handed off to nursing for permanent pathology after orientation sutures were placed.  The recurrent laryngeal nerve on the left side was tested  at 0.5 mA with movement noted on the monitor and on palpation of the PCA muscle.      The thyroid bed was irrigated with a liter of saline. Hemostasis was achieved in the thyroid bed. Multiple sustained valsalva maneuvers were performed to ensure hemostasis.  A piece of surgicel was placed over the recurrent nerve. A 7 mm flat GLORIA drain was placed and secured with a 3-0 nylon. The strap muscles were reapproximated at the midline with a 3-0 vicryl in horizontal mattress fashion. The incision was closed with buried interrupted 3-0 vicryl in the platysma. The skin was closed with a buried interrupted 3-0 vicryl followed by a running 4-0 monocryl in subcuticular fashion. Exofin was applied to the incision.     The patient was handed back to anesthesia for extubation. She was transported to the recovery room in stable condition with no immediate complications. She had no evidence of stridor or hematoma after extubation.      I was present for and participated in the entire procedure.      Sarita Miles MD    Department of Otolaryngology

## 2022-08-18 NOTE — ANESTHESIA POSTPROCEDURE EVALUATION
Patient: Miriam Beverly    Procedure: Procedure(s):  THYROIDECTOMY, TOTAL       Anesthesia Type:  General    Note:  Disposition: Outpatient   Postop Pain Control: Uneventful            Sign Out: Well controlled pain   PONV: No   Neuro/Psych: Uneventful            Sign Out: Acceptable/Baseline neuro status   Airway/Respiratory: Uneventful            Sign Out: Acceptable/Baseline resp. status   CV/Hemodynamics: Uneventful            Sign Out: Acceptable CV status; No obvious hypovolemia; No obvious fluid overload   Other NRE: NONE   DID A NON-ROUTINE EVENT OCCUR? No           Last vitals:  Vitals Value Taken Time   /87 08/18/22 1115   Temp     Pulse 89 08/18/22 1119   Resp 8 08/18/22 1119   SpO2 92 % 08/18/22 1119   Vitals shown include unvalidated device data.    Electronically Signed By: Collin Raines MD  August 18, 2022  11:21 AM

## 2022-08-18 NOTE — ANESTHESIA PREPROCEDURE EVALUATION
Anesthesia Pre-Procedure Evaluation    Patient: Miriam Beverly   MRN: [id number] : 1970        Procedure : Procedure(s):  THYROIDECTOMY, TOTAL          Past Medical History:   Diagnosis Date     Allergy-induced asthma      Arachnoid cyst of posterior cranial fossa 10/05/2021    3 cm,superior cerebellar cistern; presumptive imaging diagnosis     Gestational hypertension      HTN (hypertension)      Hyperlipidemia      Meningioma (H) 10/05/2021    presumptive imaging diagnsois; 14 mm , left temporal convexity     Postpartum cardiomyopathy      Preeclampsia      Pulmonary nodules 2022     PVC's (premature ventricular contractions)      Sinusitis 2017      Past Surgical History:   Procedure Laterality Date      SECTION  2002     MAMMOPLASTY AUGMENTATION  2012     wisdom teeth extraction        No Known Allergies   Social History     Tobacco Use     Smoking status: Never Smoker     Smokeless tobacco: Never Used   Substance Use Topics     Alcohol use: Yes     Comment: occ      Wt Readings from Last 1 Encounters:   22 78 kg (172 lb)              OUTSIDE LABS:  CBC: No results found for: WBC, HGB, HCT, PLT  BMP:   Lab Results   Component Value Date    GLC 95 2022     COAGS: No results found for: PTT, INR, FIBR  POC: No results found for: BGM, HCG, HCGS  HEPATIC: No results found for: ALBUMIN, PROTTOTAL, ALT, AST, GGT, ALKPHOS, BILITOTAL, BILIDIRECT, CAIT  OTHER: No results found for: PH, LACT, A1C, GINNY, PHOS, MAG, LIPASE, AMYLASE, TSH, T4, T3, CRP, SED    Anesthesia Plan    ASA Status:  2      Anesthesia Type: General.     - Airway: ETT   Induction: Intravenous.   Maintenance: Balanced.   Techniques and Equipment:     - Lines/Monitors: 2nd IV     Consents    Anesthesia Plan(s) and associated risks, benefits, and realistic alternatives discussed. Questions answered and patient/representative(s) expressed understanding.    - Discussed:     - Discussed with:  Patient          Postoperative Care    Pain management: IV analgesics.   PONV prophylaxis: Ondansetron (or other 5HT-3), Dexamethasone or Solumedrol     Comments:                Erika Dao MD

## 2022-08-18 NOTE — ANESTHESIA PROCEDURE NOTES
Airway         Procedure Start/Stop Times: 8/18/2022 7:35 AM  Staff -        Anesthesiologist:  Erika Dao MD       CRNA: Ailin Garcia APRN CRNA       Performed By: with residents and other anesthesia staff       Procedure performed by resident/fellow/CRNA in presence of a teaching physician.  Indications and Patient Condition       Indications for airway management: mimi-procedural       Induction type:RSI       Mask difficulty assessment: 1 - vent by mask    Final Airway Details       Final airway type: endotracheal airway       Successful airway: NIM  Endotracheal Airway Details        ETT size (mm): 6.0       Cuffed: yes       Successful intubation technique: video laryngoscopy       VL Blade Size: MAC 3       Grade View of Cords: 2       Position: Right       Measured from: lips       Secured at (cm): 23    Post intubation assessment        Placement verified by: capnometry, equal breath sounds and chest rise        Number of attempts at approach: 1       Secured with: silk tape       Ease of procedure: easy       Dentition: Intact and Unchanged    Medication(s) Administered   Medication Administration Time: 8/18/2022 7:35 AM    Additional Comments       Intubation performed by Domenico DOBBINS

## 2022-08-18 NOTE — DISCHARGE INSTRUCTIONS
"[facility]   Adult Discharge Orders & Instructions     For 24 hours after surgery    Get plenty of rest.  A responsible adult must stay with you for at least 24 hours after you leave the hospital.   Do not drive or use heavy equipment.  If you have weakness or tingling, don't drive or use heavy equipment until this feeling goes away.  Do not drink alcohol.  Avoid strenuous or risky activities.  Ask for help when climbing stairs.   You may feel lightheaded.  IF so, sit for a few minutes before standing.  Have someone help you get up.   If you have nausea (feel sick to your stomach): Drink only clear liquids such as apple juice, ginger ale, broth or 7-Up.  Rest may also help.  Be sure to drink enough fluids.  Move to a regular diet as you feel able.  You may have a slight fever. Call the doctor if your fever is over 100 F (37.7 C) (taken under the tongue) or lasts longer than 24 hours.  You may have a dry mouth, a sore throat, muscle aches or trouble sleeping.  These should go away after 24 hours.  Do not make important or legal decisions.   Call your doctor for any of the followin.  Signs of infection (fever, growing tenderness at the surgery site, a large amount of drainage or bleeding, severe pain, foul-smelling drainage, redness, swelling).    2. It has been over 8 to 10 hours since surgery and you are still not able to urinate (pass water).    3.  Headache for over 24 hours.    4.  Numbness, tingling or weakness the day after surgery (if you had spinal anesthesia).  To contact a doctor, call ENT Clinic Monday through Friday 08:00 am to 4:30 pm at [phone] and in the evening and weekends call [phone] and ask for the ENT resident \"on call\".        "

## 2022-08-22 ENCOUNTER — ALLIED HEALTH/NURSE VISIT (OUTPATIENT)
Dept: OTOLARYNGOLOGY | Facility: CLINIC | Age: 52
End: 2022-08-22
Payer: COMMERCIAL

## 2022-08-22 ENCOUNTER — TELEPHONE (OUTPATIENT)
Dept: OTOLARYNGOLOGY | Facility: CLINIC | Age: 52
End: 2022-08-22

## 2022-08-22 DIAGNOSIS — C73 PAPILLARY THYROID CARCINOMA (H): Primary | ICD-10-CM

## 2022-08-22 PROCEDURE — 99207 PR NO CHARGE NURSE ONLY: CPT

## 2022-08-22 NOTE — TELEPHONE ENCOUNTER
Returned patient call, GLORIA drain output less than 30ml in 24 hours. Pt will come to clinic at 1200 for drain removal.

## 2022-08-22 NOTE — PROGRESS NOTES
Patient in clinic today for GLORIA drain removal. Drain output in last 24 hours was 18 ml, indicating GLORIA removal as it was less than 30 ml in 24 hours. Incision site evaluated and it was clean, dry and intact without evidence of redness, swelling or drainage. GLORIA site was cleansed, suture around tubing was removed, drain bulb was opened, and drain was removed easily. Patient educated on signs and symptoms of infection, site care and provided number to call with further questions or concerns. Patient verbalized understanding and was encouraged to call with any further questions or concerns.    Patient will follow up as scheduled on Friday.    Yeni SOLISN, RN

## 2022-08-22 NOTE — TELEPHONE ENCOUNTER
[facility] Call Center    Phone Message    May a detailed message be left on voicemail: yes     Reason for Call: Other: Pt called into say that it is time for her drain tube to come out and I could not find an appt before Wednesday.She was told she could come in anytime same day kind of thing to have it removed. Please call to discuss. Thank you     Action Taken: Message routed to:  [facility] ENT    Travel Screening: Not Applicable

## 2022-08-25 NOTE — PROGRESS NOTES
Dear Dr. Mcqueen:    I had the pleasure of seeing Miriam Beverly in follow-up today at the [facility].     History of Present Illness:   Miriam Beverly is a 52 year old woman with a papillary thyroid carcinoma. The patient saw her Ob/gyn in June 2022 and noted a mass of the neck to the right of midline. The mass had been present for about 2 months prior to presentation.    She had a thyroid U/S on 6/16/2022 which showed the right lobe measured 5.4 x 3.0 x 2.1 cm, several small cysts up to 1.3 cm, TIRADS 4 nodule measuring 3.5 x 2.6 x 2.1 (report says 8.5 cm but impression indicates 3.5 cm), left lobe measured 5.6 x 1.3 x 1.0 cm with small cysts measuring up to 5 mm.     She had a thyroid biopsy on 6/23/2022 which was suspicious for papillary thyroid cancer.    She had a CT scan by cardiology which showed pulmonary nodules.       Interval history:  She comes in today for follow-up. She was last seen in clinic in July 2022. She had a preop neck U/S which showed no metastatic disease. She met with Dr Valladares in August 2022. She met with pulmonary for the lung nodules in August 2022. She was taken to the OR on 8/18/2022 for a total thyroidectomy. Postop PTH was 11, ionized calcium normal. Final pathology is pending.     She says that she is doing well since surgery, has no problems. She last took oxycodone on Saturday. She is no longer taking tylenol and has no pain. She does have some pulling/stretchign in her neck. She has no choking with swallowing. She feels like her voice is overall ok, mild raspiness which has improved. She had some finger tingling earlier this week, realized she had not taken her calcium, none since. She is starting BID calcium today.        MEDICATIONS:     Current Outpatient Medications   Medication Sig Dispense Refill     acetaminophen (TYLENOL) 325 MG tablet Take 2 tablets (650 mg) by mouth every 4 hours as needed for mild pain 50 tablet 0     calcitRIOL  (ROCALTROL) 0.25 MCG capsule Take 1 capsule (0.25 mcg) by mouth 2 times daily for 21 days 42 capsule 0     calcium carbonate (TUMS) 500 MG chewable tablet Take 5 tablets (2,500 mg) by mouth 3 times daily for 7 days, THEN 5 tablets (2,500 mg) 2 times daily for 7 days, THEN 5 tablets (2,500 mg) daily for 7 days. 210 tablet 0     levothyroxine (SYNTHROID/LEVOTHROID) 125 MCG tablet Take 1 tablet (125 mcg) by mouth daily for 60 days 60 tablet 0     lisinopril (ZESTRIL) 10 MG tablet TAKE 1 TABLET(10 MG) BY MOUTH EVERY DAY       loratadine (CLARITIN) 10 MG tablet Take 10 mg by mouth daily       metoprolol succinate ER (TOPROL XL) 50 MG 24 hr tablet TAKE 1 TABLET(50 MG) BY MOUTH EVERY DAY       Multiple Vitamin (ONE-A-DAY ESSENTIAL) TABS Take 1 tablet by mouth       senna-docusate (SENOKOT-S/PERICOLACE) 8.6-50 MG tablet Take 1-2 tablets by mouth 2 times daily 30 tablet 0     ondansetron (ZOFRAN ODT) 4 MG ODT tab Take 1 tablet (4 mg) by mouth every 8 hours as needed for nausea (Patient not taking: Reported on 8/26/2022) 4 tablet 0     oxyCODONE (ROXICODONE) 5 MG tablet Take 1-2 tablets (5-10 mg) by mouth every 6 hours as needed for moderate to severe pain (Patient not taking: Reported on 8/26/2022) 15 tablet 0       ALLERGIES:  No Known Allergies    HABITS/SOCIAL HISTORY:   Works for non-profit.   Occasional alcohol use.   .   Nonsmoker.   Live with partner.    Social History     Socioeconomic History     Marital status:      Spouse name: Not on file     Number of children: Not on file     Years of education: Not on file     Highest education level: Not on file   Occupational History     Not on file   Tobacco Use     Smoking status: Never Smoker     Smokeless tobacco: Never Used   Substance and Sexual Activity     Alcohol use: Yes     Comment: occ     Drug use: Never     Sexual activity: Not on file   Other Topics Concern     Not on file   Social History Narrative     Not on file     Social Determinants of  Health     Financial Resource Strain: Not on file   Food Insecurity: Not on file   Transportation Needs: Not on file   Physical Activity: Not on file   Stress: Not on file   Social Connections: Not on file   Intimate Partner Violence: Not on file   Housing Stability: Not on file       PAST MEDICAL HISTORY:   Past Medical History:   Diagnosis Date     Allergy-induced asthma      Arachnoid cyst of posterior cranial fossa 10/05/2021    3 cm,superior cerebellar cistern; presumptive imaging diagnosis     Gestational hypertension      HTN (hypertension)      Hyperlipidemia      Meningioma (H) 10/05/2021    presumptive imaging diagnsois; 14 mm , left temporal convexity     Postpartum cardiomyopathy      Preeclampsia      Pulmonary nodules 2022     PVC's (premature ventricular contractions)      Sinusitis 2017        PAST SURGICAL HISTORY:   Past Surgical History:   Procedure Laterality Date      SECTION       GYN SURGERY       MAMMOPLASTY AUGMENTATION  2012     THYROIDECTOMY N/A 2022    Procedure: THYROIDECTOMY, TOTAL;  Surgeon: Sarita Miles MD;  Location: [facility] OR     wisdom teeth extraction         FAMILY HISTORY:    Family History   Problem Relation Age of Onset     Hypertension Mother      Hypertension Father      Breast Cancer Paternal Grandmother      Diabetes Paternal Aunt      Diabetes Paternal Aunt      Thyroid Cancer No family hx of      Thyroid Disease No family hx of        REVIEW OF SYSTEMS:  12 point ROS was negative other than the symptoms noted above in the HPI.  Patient Supplied Answers to Review of Systems  [facility] ROS 2022   Constitutional: Weight loss, Appetite change, Unexplained fatigue   Neurology: Dizzy spells, Headache   Psychology: Frequently feeling anxious   Ears, Nose, Throat: Hoarseness   Cardiopulmonary: Cough   Musculoskeletal: Neck pain   Allergy/Immunology: Allergies or hay fever         PHYSICAL EXAMINATION:   /73 (BP Location: Right arm,  "Patient Position: Sitting, Cuff Size: Adult Regular)   Pulse 69   Ht 1.676 m (5' 6\")   Wt 75.8 kg (167 lb)   LMP 07/28/2022 (Approximate)   BMI 26.95 kg/m     Patient in NAD  Breathing comfortably on room air  No hoarseness  Neck incision healing appropriately with dermabond in place, no swelling, no infection, no seroma    PROCEDURES:       RESULTS REVIEWED:   PTH and iCa reviewed after visit    IMPRESSION AND PLAN:   Miriam Beverly is a 52 year old woman with a papillary thyroid cancer of the right thyroid lobe. She underwent total thyroidectomy on 8/18/2022.     Final pathology is pending - we will contact her with the results.     She is on a calcium taper. We discussed that if she has numbness/tingling she can take an extra dose of calcium. Labs were checked after her visit.    She is on synthroid.    She has already established care with Dr Valladares, defer adjustment to synthroid and further imaging to her team.    We discussed wound care of the incision.    I will see her back as needed.    Thank you very much for the opportunity to participate in the care of your patient.      Sarita Miles MD  Otolaryngology- Head & Neck Surgery      This note was dictated with voice recognition software and then edited. Please excuse any unintentional errors.         CC:  Kiki Mcqueen MD  Ob Gyn and Infertility  [address]  [place] [address]  "

## 2022-08-26 ENCOUNTER — OFFICE VISIT (OUTPATIENT)
Dept: OTOLARYNGOLOGY | Facility: CLINIC | Age: 52
End: 2022-08-26
Payer: COMMERCIAL

## 2022-08-26 ENCOUNTER — LAB (OUTPATIENT)
Dept: LAB | Facility: CLINIC | Age: 52
End: 2022-08-26
Payer: COMMERCIAL

## 2022-08-26 VITALS
HEART RATE: 69 BPM | WEIGHT: 167 LBS | DIASTOLIC BLOOD PRESSURE: 73 MMHG | HEIGHT: 66 IN | BODY MASS INDEX: 26.84 KG/M2 | SYSTOLIC BLOOD PRESSURE: 104 MMHG

## 2022-08-26 DIAGNOSIS — C73 PAPILLARY THYROID CARCINOMA (H): ICD-10-CM

## 2022-08-26 DIAGNOSIS — C73 PAPILLARY THYROID CARCINOMA (H): Primary | ICD-10-CM

## 2022-08-26 LAB
CA-I BLD-MCNC: 4.9 MG/DL (ref 4.4–5.2)
PTH-INTACT SERPL-MCNC: 13 PG/ML (ref 15–65)

## 2022-08-26 PROCEDURE — 82330 ASSAY OF CALCIUM: CPT | Performed by: PATHOLOGY

## 2022-08-26 PROCEDURE — 99024 POSTOP FOLLOW-UP VISIT: CPT | Performed by: OTOLARYNGOLOGY

## 2022-08-26 PROCEDURE — 36415 COLL VENOUS BLD VENIPUNCTURE: CPT | Performed by: PATHOLOGY

## 2022-08-26 PROCEDURE — 83970 ASSAY OF PARATHORMONE: CPT | Performed by: PATHOLOGY

## 2022-08-26 ASSESSMENT — PAIN SCALES - GENERAL: PAINLEVEL: NO PAIN (0)

## 2022-08-26 NOTE — LETTER
8/26/2022       RE: Miriam Beverly  [address]  [facility] [address]     Dear Colleague,    Thank you for referring your patient, Miriam Beverly, to the [facility] [place] at [facility]. Please see a copy of my visit note below.    Dear Dr. Mcqueen:    I had the pleasure of seeing Miriam Beverly in follow-up today at the [facility].     History of Present Illness:   Miriam Beverly is a 52 year old woman with a papillary thyroid carcinoma. The patient saw her Ob/gyn in June 2022 and noted a mass of the neck to the right of midline. The mass had been present for about 2 months prior to presentation.    She had a thyroid U/S on 6/16/2022 which showed the right lobe measured 5.4 x 3.0 x 2.1 cm, several small cysts up to 1.3 cm, TIRADS 4 nodule measuring 3.5 x 2.6 x 2.1 (report says 8.5 cm but impression indicates 3.5 cm), left lobe measured 5.6 x 1.3 x 1.0 cm with small cysts measuring up to 5 mm.     She had a thyroid biopsy on 6/23/2022 which was suspicious for papillary thyroid cancer.    She had a CT scan by cardiology which showed pulmonary nodules.       Interval history:  She comes in today for follow-up. She was last seen in clinic in July 2022. She had a preop neck U/S which showed no metastatic disease. She met with Dr Valladares in August 2022. She met with pulmonary for the lung nodules in August 2022. She was taken to the OR on 8/18/2022 for a total thyroidectomy. Postop PTH was 11, ionized calcium normal. Final pathology is pending.     She says that she is doing well since surgery, has no problems. She last took oxycodone on Saturday. She is no longer taking tylenol and has no pain. She does have some pulling/stretchign in her neck. She has no choking with swallowing. She feels like her voice is overall ok, mild raspiness which has improved. She had some finger tingling earlier this  week, realized she had not taken her calcium, none since. She is starting BID calcium today.        MEDICATIONS:     Current Outpatient Medications   Medication Sig Dispense Refill     acetaminophen (TYLENOL) 325 MG tablet Take 2 tablets (650 mg) by mouth every 4 hours as needed for mild pain 50 tablet 0     calcitRIOL (ROCALTROL) 0.25 MCG capsule Take 1 capsule (0.25 mcg) by mouth 2 times daily for 21 days 42 capsule 0     calcium carbonate (TUMS) 500 MG chewable tablet Take 5 tablets (2,500 mg) by mouth 3 times daily for 7 days, THEN 5 tablets (2,500 mg) 2 times daily for 7 days, THEN 5 tablets (2,500 mg) daily for 7 days. 210 tablet 0     levothyroxine (SYNTHROID/LEVOTHROID) 125 MCG tablet Take 1 tablet (125 mcg) by mouth daily for 60 days 60 tablet 0     lisinopril (ZESTRIL) 10 MG tablet TAKE 1 TABLET(10 MG) BY MOUTH EVERY DAY       loratadine (CLARITIN) 10 MG tablet Take 10 mg by mouth daily       metoprolol succinate ER (TOPROL XL) 50 MG 24 hr tablet TAKE 1 TABLET(50 MG) BY MOUTH EVERY DAY       Multiple Vitamin (ONE-A-DAY ESSENTIAL) TABS Take 1 tablet by mouth       senna-docusate (SENOKOT-S/PERICOLACE) 8.6-50 MG tablet Take 1-2 tablets by mouth 2 times daily 30 tablet 0     ondansetron (ZOFRAN ODT) 4 MG ODT tab Take 1 tablet (4 mg) by mouth every 8 hours as needed for nausea (Patient not taking: Reported on 8/26/2022) 4 tablet 0     oxyCODONE (ROXICODONE) 5 MG tablet Take 1-2 tablets (5-10 mg) by mouth every 6 hours as needed for moderate to severe pain (Patient not taking: Reported on 8/26/2022) 15 tablet 0       ALLERGIES:  No Known Allergies    HABITS/SOCIAL HISTORY:   Works for non-profit.   Occasional alcohol use.   .   Nonsmoker.   Live with partner.    Social History     Socioeconomic History     Marital status:      Spouse name: Not on file     Number of children: Not on file     Years of education: Not on file     Highest education level: Not on file   Occupational History     Not on  file   Tobacco Use     Smoking status: Never Smoker     Smokeless tobacco: Never Used   Substance and Sexual Activity     Alcohol use: Yes     Comment: occ     Drug use: Never     Sexual activity: Not on file   Other Topics Concern     Not on file   Social History Narrative     Not on file     Social Determinants of Health     Financial Resource Strain: Not on file   Food Insecurity: Not on file   Transportation Needs: Not on file   Physical Activity: Not on file   Stress: Not on file   Social Connections: Not on file   Intimate Partner Violence: Not on file   Housing Stability: Not on file       PAST MEDICAL HISTORY:   Past Medical History:   Diagnosis Date     Allergy-induced asthma      Arachnoid cyst of posterior cranial fossa 10/05/2021    3 cm,superior cerebellar cistern; presumptive imaging diagnosis     Gestational hypertension      HTN (hypertension)      Hyperlipidemia      Meningioma (H) 10/05/2021    presumptive imaging diagnsois; 14 mm , left temporal convexity     Postpartum cardiomyopathy      Preeclampsia      Pulmonary nodules 2022     PVC's (premature ventricular contractions)      Sinusitis 2017        PAST SURGICAL HISTORY:   Past Surgical History:   Procedure Laterality Date      SECTION       GYN SURGERY       MAMMOPLASTY AUGMENTATION  2012     THYROIDECTOMY N/A 2022    Procedure: THYROIDECTOMY, TOTAL;  Surgeon: Sarita Miles MD;  Location: [facility] OR     wisdom teeth extraction         FAMILY HISTORY:    Family History   Problem Relation Age of Onset     Hypertension Mother      Hypertension Father      Breast Cancer Paternal Grandmother      Diabetes Paternal Aunt      Diabetes Paternal Aunt      Thyroid Cancer No family hx of      Thyroid Disease No family hx of        REVIEW OF SYSTEMS:  12 point ROS was negative other than the symptoms noted above in the HPI.  Patient Supplied Answers to Review of Systems  [facility] ROS 2022   Constitutional: Weight  "loss, Appetite change, Unexplained fatigue   Neurology: Dizzy spells, Headache   Psychology: Frequently feeling anxious   Ears, Nose, Throat: Hoarseness   Cardiopulmonary: Cough   Musculoskeletal: Neck pain   Allergy/Immunology: Allergies or hay fever         PHYSICAL EXAMINATION:   /73 (BP Location: Right arm, Patient Position: Sitting, Cuff Size: Adult Regular)   Pulse 69   Ht 1.676 m (5' 6\")   Wt 75.8 kg (167 lb)   LMP 07/28/2022 (Approximate)   BMI 26.95 kg/m     Patient in NAD  Breathing comfortably on room air  No hoarseness  Neck incision healing appropriately with dermabond in place, no swelling, no infection, no seroma    PROCEDURES:       RESULTS REVIEWED:   PTH and iCa reviewed after visit    IMPRESSION AND PLAN:   Miriam Beverly is a 52 year old woman with a papillary thyroid cancer of the right thyroid lobe. She underwent total thyroidectomy on 8/18/2022.     Final pathology is pending - we will contact her with the results.     She is on a calcium taper. We discussed that if she has numbness/tingling she can take an extra dose of calcium. Labs were checked after her visit.    She is on synthroid.    She has already established care with Dr Valladares, defer adjustment to synthroid and further imaging to her team.    We discussed wound care of the incision.    I will see her back as needed.    Thank you very much for the opportunity to participate in the care of your patient.      Sarita Miles MD  Otolaryngology- Head & Neck Surgery      This note was dictated with voice recognition software and then edited. Please excuse any unintentional errors.         CC:  Kiki Mcqueen MD  Ob Gyn and Infertility  [address]  [facility] [address]      "

## 2022-08-26 NOTE — PATIENT INSTRUCTIONS
1405 ASSISTED PT ONTO BEDPAN, PT VOIDED APPROX 600 CC CLEAR YELLOW
URINE. DRESSING REMAINS CDI RIGHT GROIN, PEDAL PULSES PALPABLE. PT
DENIES ANY C/O, FRIEND AT BEDSIDE. VSS.
1425 DRESSING CDI, PEDAL PULSES PALPABLE, PT DENIES ANY C/O AT THIS
TIME. VSS. Schedule lab visit today

## 2022-08-29 ENCOUNTER — VIRTUAL VISIT (OUTPATIENT)
Dept: ENDOCRINOLOGY | Facility: CLINIC | Age: 52
End: 2022-08-29
Payer: COMMERCIAL

## 2022-08-29 DIAGNOSIS — R91.8 PULMONARY NODULES: ICD-10-CM

## 2022-08-29 DIAGNOSIS — R79.89 LOW SERUM PARATHYROID HORMONE (PTH): ICD-10-CM

## 2022-08-29 DIAGNOSIS — Z98.890 POST-OPERATIVE STATE: ICD-10-CM

## 2022-08-29 DIAGNOSIS — R89.6 ABNORMAL CYTOLOGY: Primary | ICD-10-CM

## 2022-08-29 DIAGNOSIS — E89.0 POSTOPERATIVE HYPOTHYROIDISM: ICD-10-CM

## 2022-08-29 PROCEDURE — 99214 OFFICE O/P EST MOD 30 MIN: CPT | Mod: 95

## 2022-08-29 RX ORDER — CALCITRIOL 0.25 UG/1
0.25 CAPSULE, LIQUID FILLED ORAL DAILY
Qty: 42 CAPSULE | Refills: 0 | COMMUNITY
Start: 2022-08-29 | End: 2022-09-26

## 2022-08-29 ASSESSMENT — ENCOUNTER SYMPTOMS
HYPOTENSION: 0
CHILLS: 0
TASTE DISTURBANCE: 0
STIFFNESS: 0
HOT FLASHES: 0
FEVER: 0
MUSCLE WEAKNESS: 0
CONSTIPATION: 0
DECREASED APPETITE: 1
POLYPHAGIA: 0
MUSCLE CRAMPS: 0
HYPERTENSION: 0
INCREASED ENERGY: 0
SINUS PAIN: 0
SORE THROAT: 0
LIGHT-HEADEDNESS: 1
HEARTBURN: 0
RECTAL PAIN: 0
NECK PAIN: 0
NIGHT SWEATS: 0
HOARSE VOICE: 0
FATIGUE: 1
POLYDIPSIA: 0
JOINT SWELLING: 0
EXERCISE INTOLERANCE: 0
MYALGIAS: 1
WEIGHT GAIN: 0
HALLUCINATIONS: 0
NECK MASS: 0
DIARRHEA: 0
WEIGHT LOSS: 0
PALPITATIONS: 1
BLOATING: 1
LEG PAIN: 0
SLEEP DISTURBANCES DUE TO BREATHING: 0
JAUNDICE: 0
BACK PAIN: 1
ORTHOPNEA: 0
BLOOD IN STOOL: 0
ARTHRALGIAS: 0
BOWEL INCONTINENCE: 0
NAUSEA: 1
VOMITING: 0
TROUBLE SWALLOWING: 0
ALTERED TEMPERATURE REGULATION: 0
SMELL DISTURBANCE: 0
SINUS CONGESTION: 1
ABDOMINAL PAIN: 0
DECREASED LIBIDO: 0
SYNCOPE: 0

## 2022-08-29 NOTE — PATIENT INSTRUCTIONS
Reduce calcitriol to once/day    Labs mid September    The surgical pathology is still pending, which is unusual.

## 2022-08-29 NOTE — PROGRESS NOTES
Miriam Beverly is being evaluated via a billable video visit.        How would you like to obtain your AVS? "[employer]  For the video visit, send the invitation by: Text to cell phone: [phone]  Will anyone else be joining your video visit? No

## 2022-08-29 NOTE — NURSING NOTE
Patient denies any changes since echeck-in regarding medication and allergies and states all information entered during echeck-in remains accurate.  Taylor Myhre,dianaf

## 2022-08-29 NOTE — PROGRESS NOTES
Endocrine video visit note--     Attending Assessment/Plan :     1.  Post surgical hypothyroidism. On LT4 125 mcg/day (1.65 mcg/kg/day)  Unstable   Labs mid September     Addendum  8/18/2022 surgical pathology 4.2 cm follicular adenoma with hurthle cell changes right lobe ; 4 benign lymph nodes  9/15/2022 PTH TSH 1.09, free T4 1.2, Ca 8.7, phos 3.1     2.  Abnormal cytology suspicious for papillary thyroid carcinoma.  She is s/p total Thyroidectomy.  Surgical pathology is still pending.   RTC late September, 2022    4.  Pulmonary nodules noted right lung 6/2022 and 7/22 imaging  This will require follow up - plan as per # 1 and 2 first     4.  Brain masses  A) extraaxial mass presumed meningioma  B) superior cerebellar cistern mass presumed arachnoid cyst    Perimenopause by age     Low PTH post op - Unstable.  Reduce calcitriol to once/day now. Spread out the calcium at present dose (10/day in divided dose)    Due to the COVID 19 pandemic this visit was a video visit. The patient gave verbal consent for the visit today.    I have independently reviewed and interpreted labs, imaging as indicated.     Chart review/prep time 1  4508-1744  Visit Start time 1559   Visit Stop time 1609   _25_ minutes spent on the date of the encounter doing chart review, history and exam, documentation and further activities as noted above.    Cheyenne Valladares MD    Chief complaint: HISTORY OF PRESENT ILLNESS  Miriam presents for follow up of of recent thyroid surgery.  I have seen her once before on 8/3/2022.    Right thyroid enlargement was felt on examination 9/29/2021.   FNAB of the 3.5 cm right dominant nodule was read as suspicious for papillary thyroid carcinoma.   Treatment has been as follows:   8/18/2022 total thyroidectomy (Dr Miles) .  Surgical pathology is still pending?   PTH was a little low post op but she did not have hypocalcemia.  She has already been back to see Dr Miles post op.   Today she says the surgery went  well.  She still has some fatigue.     She is still on calcium  (5 tablete bid)  and calcitriol  Bid     Endocrine relevant labs are as follows:  5/16/13 TSH 1.24  9/29/2021 TSh 1.45  8/18/2022 PTH 11, iCa 4.6  8/26/2022 PTH 13, iCa 4.9    Relevant imaging is as follows: (as read by me as it pertains to endocrine relevant organs)  10/5/2021 brain MRI: pituitary appears normal ; T1 hyperintense mass left  described by radiologist as Homogeneous enhancing extra-axial mass along the left temporal convexity in the middle cranial fossa 11 x 19 x  14 mm, in keeping with a benign meningioma. Mild mass effect on the lateral temporal gyri; I don' see the cerebellar cistern cyst.    6/16/22 thyroid US ([facility]):  Right thyroid nodule 2.6 x 2.1 x  3.5 cm hypoechoic heterogeneous -6/23/2022 FNAB  Right medial inferior  0.7 x 0.6x 1.2 cystic with small mural solid  Right inf medial  0.8 x 0.5 x 0.9 mixed   Left inferior mid posterior  0.3 x  0.2 x 0.4   Left posterior mid 0.3 x 0.3 x 0.5 hypoechoic  7/13/2022 neck US [facility]   7/18/2022 neck US  Right level 5 inferior # 1 0.7 x   Left level 3 # 1 0.3 x 0.2 x   Left level 3 # 2 0.4 x 0.6 x 1.1   7/27/2022 CT chest ([facility])    REVIEW OF SYSTEMS  No regain of lost weight  Appetite remains low  Voice is normal  Swallow is normal  Yawn, sneeze, cough causes tension in the neck.       Past Medical History  Past Medical History:   Diagnosis Date     Allergy-induced asthma      Arachnoid cyst of posterior cranial fossa 10/05/2021    3 cm,superior cerebellar cistern; presumptive imaging diagnosis     Gestational hypertension      HTN (hypertension)      Hyperlipidemia      Meningioma (H) 10/05/2021    presumptive imaging diagnsois; 14 mm , left temporal convexity     Postpartum cardiomyopathy 2002     Preeclampsia      Pulmonary nodules 06/2022     PVC's (premature ventricular contractions)      Sinusitis 12/2017     Past Surgical History:   Procedure Laterality Date       SECTION       GYN SURGERY       MAMMOPLASTY AUGMENTATION  2012     THYROIDECTOMY N/A 2022    Procedure: THYROIDECTOMY, TOTAL;  Surgeon: Sarita Miles MD;  Location: [facility] OR     wisdom teeth extraction       Medications    Current Outpatient Medications   Medication Sig Dispense Refill     calcitRIOL (ROCALTROL) 0.25 MCG capsule Take 1 capsule (0.25 mcg) by mouth daily 42 capsule 0     calcium carbonate (TUMS) 500 MG chewable tablet Take 5 tablets (2,500 mg) by mouth 3 times daily for 7 days, THEN 5 tablets (2,500 mg) 2 times daily for 7 days, THEN 5 tablets (2,500 mg) daily for 7 days. 210 tablet 0     levothyroxine (SYNTHROID/LEVOTHROID) 125 MCG tablet Take 1 tablet (125 mcg) by mouth daily for 60 days 60 tablet 0     lisinopril (ZESTRIL) 10 MG tablet TAKE 1 TABLET(10 MG) BY MOUTH EVERY DAY       loratadine (CLARITIN) 10 MG tablet Take 10 mg by mouth daily       metoprolol succinate ER (TOPROL XL) 50 MG 24 hr tablet TAKE 1 TABLET(50 MG) BY MOUTH EVERY DAY       Multiple Vitamin (ONE-A-DAY ESSENTIAL) TABS Take 1 tablet by mouth       Allergies  No Known Allergies    Family History  Family History   Problem Relation Age of Onset     Hypertension Mother      Hypertension Father      Breast Cancer Paternal Grandmother      Diabetes Paternal Aunt      Diabetes Paternal Aunt      Thyroid Cancer No family hx of      Thyroid Disease No family hx of      Social History  Social History     Tobacco Use     Smoking status: Never Smoker     Smokeless tobacco: Never Used   Substance Use Topics     Alcohol use: Yes     Comment: occ     Drug use: Never     Walks for exercise; eating 3 meals/day.      Physical Exam  LMP 2022 (Approximate)   There is no height or weight on file to calculate BMI.   BP Readings from Last 1 Encounters:   22 104/73      Pulse Readings from Last 1 Encounters:   22 69      Resp Readings from Last 1 Encounters:   22 15      Temp Readings from Last 1  "Encounters:   08/18/22 98.8  F (37.1  C) (Oral)      SpO2 Readings from Last 1 Encounters:   08/18/22 100%      Wt Readings from Last 1 Encounters:   08/26/22 75.8 kg (167 lb)      Ht Readings from Last 1 Encounters:   08/26/22 1.676 m (5' 6\")     GENERAL: no distress; voice normal   SKIN: Visible skin clear. No significant rash, abnormal pigmentation or lesions.  EYES: Eyes grossly normal to inspection.  No discharge or erythema, or obvious scleral/conjunctival abnormalities.  NECK: no visible masses; low transverse surgical scar looks clean, no redness or swelling.   RESP: No audible wheeze, cough, or visible cyanosis.  No visible retractions or increased work of breathing.    NEURO:   Awake, alert, responds appropriately to questions.  Mentation and speech fluent.  PSYCH:affect normal,  appearance well-groomed.    DATA REVIEW     Latest Reference Range & Units 08/18/22 11:01 08/26/22 14:26   Calcium Ionized Whole Blood 4.4 - 5.2 mg/dL 4.6 4.9   Parathyroid Hormone Intact 15 - 65 pg/mL 11 (L) 13 (L)   (L): Data is abnormally low    Surgical Pathology Exam: [id number]  Order: [id number]   Collected 8/18/2022  8:09 AM     Status: Final result     Visible to patient: Yes (seen)     0 Result Notes    Component  Resulting Agency   Case Report   Surgical Pathology Report                         Case: [id number]                                   Authorizing Provider:  Sariat Miles MD     Collected:           08/18/2022 08:09 AM           Ordering Location:      MAIN OR                 Received:            08/18/2022 10:38 AM           Pathologist:           Richelle Rubio MD                                                    Specimens:   A) - Other, Delphian Node and Pretracheal level 6                                                    B) - Other, Total thyroidectomy Stitch on Right side                                       Final Diagnosis   A.  LYMPH NODES, DELPHIAN NODE AND PRETRACHEAL LEVEL 6, " "EXCISION:  -Four benign lymph nodes.     B.  THYROID GLAND, TOTAL THYROIDECTOMY:  -Follicular adenoma with Hurthle cell changes, located within right lobe, 4.2 cm in greatest dimension.  -The neoplasm is encapsulated with no capsular or vascular invasion identified.  -Benign colloid nodules present in both lobes.  -No evidence of malignancy identified, see comment.   Electronically signed by Richelle Rubio MD on 8/30/2022 at  4:47 PM   Comment  [facility]   Thyroid NGS panel on follicular adenoma is in progress and results will be provided separately.   Clinical Information  [facility]   Procedure:  THYROIDECTOMY, TOTAL  Pre-op Diagnosis: Papillary thyroid carcinoma (H) [C73]  Post-op Diagnosis: C73 - Papillary thyroid carcinoma (H) [ICD-10-CM]     51 year old woman with a likely papillary thyroid cancer. She had a thyroid U/S on 6/16/2022 which showed the right lobe measured 5.4 x 3.0 x 2.1 cm, several small cysts up to 1.3 cm, TIRADS 4 nodule measuring 3.5 x 2.6 x 2.1 (report says 8.5 cm but impression indicates 3.5 cm), left lobe measured 5.6 x 1.3 x 1.0 cm with small cysts measuring up to 5 mm.  She had a thyroid biopsy on 6/23/2022 which was suspicious for papillary thyroid cancer. She has elected to proceed with total thyroidectomy.   Gross Description  GRACIE   A(1). Other, Delphian Node and Pretracheal level 6:  The specimen is received in formalin with proper patient identification, labeled \"delphian node and pretracheal level 6\".  The specimen consists of 2 pieces of white-tan soft tissue ranging from 0.9 to 1.2 cm in greatest dimension.  5 potential lymph nodes are resected and submitted in A1.  Representative sections submitted      B(2). Other, Total thyroidectomy Stitch on Right side :  The specimen is received fresh with proper patient identification labeled \"total thyroidectomy stitch on right side\".  The specimen consists of 6.5 x 6.5 x 3.2 cm in greatest dimension thyroid tissue weighing 31.91 g.  " The surface of the tissue is purple-tan soft tissue without any nodules or focal areas of hemorrhage.  Left lobe measures 5.3 cm superior to posterior, 1.4 cm anterior to posterior, 2.4 cm transversely.  Right lobe 5.7 cm superior to posterior, 2.7 cm anterior to posterior 3.0 cm transversely.  Pyramidal lobe is absent.  Thyroid capsule is intact.  Anterior surface of the thyroid is inked in blue, posterior surface of the thyroid is inked in black and serially section.  Serially sectioning thyroid from superior to inferior reveals parenchyma as dark brown soft tissue     Left lobe has an encapsulated nodule measuring 1.1 x 0.9 x 0.7 cm measuring 2 cm from the isthmus and the left superior lobe.  The capsule buts anterior and posterior margins.     Right lobe has a well encapsulated mass pink yellow soft tissue measuring 4.2 (superior to inferior) x 2.5 (anterior to posterior) x 2.5 (transversely) cm in greatest dimension with capsule abutting anterior and posterior margins but does not invade the thyroid capsule. Additionally, there is a nodule present in the lower right thyroid measuring 0.8(superior to inferior) x 0.6 x 0.6 cm     Remaining parenchyma is beefy brown soft tissue.     Representative sections submitted  Summary of sections  B1: Nodule from the left lobe  B2: Normal parenchyma from the left lobe  B3-B12: Entire mass submitted in sections  B13: Nodule from the right lobe.  B14:Normal parenchyma from right lobe                        Microscopic Description  [facility]   Microscopic examination performed.  Immunohistochemistry studies, performed appropriate controls on block B5, demonstrate that the cells comprising the follicular adenoma are negative for HBME-1 and BRAF V600E, while focally positive for CK19.  The immunophenotype supports the interpretation.   Performing Labs  [facility]   The technical component of this testing was completed at [facility]          Thyroid Panel Focused NGS Panel: [id number]  Order: [id number]   Collected 8/30/2022  7:15 PM     Status: Final result     Visible to patient: No (scheduled for 9/22/2022  4:35 PM)     0 Result Notes    Component    Significant Results    Detected Alterations of Known or Potential Pathogenicity: None     Detected Alterations of Uncertain Significance: None     Genes with No Detected Clinically Significant Alterations: BRAF, HRAS, KRAS, NRAS, RET   Interpretation    No mutations were identified in the analyzed genes.          NGS Fusion Profile Assay: [id number]  Order: [id number]   Collected 8/30/2022  7:15 PM     Status: Final result     Visible to patient: No (scheduled for 9/19/2022  4:52 PM)     0 Result Notes    Component    Specimen Description    Tissue: Fixed slides-Collected 8/18/22, Thyroid, [id number] B5  [id number] B5   RESULTS    Fusion Event: NEGATIVE

## 2022-08-29 NOTE — LETTER
8/29/2022       RE: Miriam Beverly  [address]  [place] [address]     Dear Colleague,    Thank you for referring your patient, Miriam Beverly, to the [facility] [place] at [facility]. Please see a copy of my visit note below.    Endocrine video visit note--     Attending Assessment/Plan :     1.  Post surgical hypothyroidism. On LT4 125 mcg/day (1.65 mcg/kg/day)  Unstable   Labs mid September     2.  Abnormal cytology suspicious for papillary thyroid carcinoma.  She is s/p total Thyroidectomy.  Surgical pathology is still pending.   RTC late September, 2022    4.  Pulmonary nodules noted right lung 6/2022 and 7/22 imaging  This will require follow up - plan as per # 1 and 2 first     4.  Brain masses  A) extraaxial mass presumed meningioma  B) superior cerebellar cistern mass presumed arachnoid cyst    Perimenopause by age     Low PTH post op - Unstable.  Reduce calcitriol to once/day now. Spread out the calcium at present dose (10/day in divided dose)    Due to the COVID 19 pandemic this visit was a video visit. The patient gave verbal consent for the visit today.    I have independently reviewed and interpreted labs, imaging as indicated.     Chart review/prep time 1  [id number]  Visit Start time 1559   Visit Stop time 1609   _25_ minutes spent on the date of the encounter doing chart review, history and exam, documentation and further activities as noted above.    Cheyenne Valladares MD    Chief complaint: HISTORY OF PRESENT ILLNESS  Miriam presents for follow up of of recent thyroid surgery.  I have seen her once before on 8/3/2022.    Right thyroid enlargement was felt on examination 9/29/2021.   FNAB of the 3.5 cm right dominant nodule was read as suspicious for papillary thyroid carcinoma.   Treatment has been as follows:   8/18/2022 total thyroidectomy (Dr Miles) .  Surgical pathology is still pending?   PTH was a little  low post op but she did not have hypocalcemia.  She has already been back to see Dr Miles post op.   Today she says the surgery went well.  She still has some fatigue.     She is still on calcium  (5 tablete bid)  and calcitriol  Bid     Endocrine relevant labs are as follows:  5/16/13 TSH 1.24  9/29/2021 TSh 1.45  8/18/2022 PTH 11, iCa 4.6  8/26/2022 PTH 13, iCa 4.9    Relevant imaging is as follows: (as read by me as it pertains to endocrine relevant organs)  10/5/2021 brain MRI: pituitary appears normal ; T1 hyperintense mass left  described by radiologist as Homogeneous enhancing extra-axial mass along the left temporal convexity in the middle cranial fossa 11 x 19 x  14 mm, in keeping with a benign meningioma. Mild mass effect on the lateral temporal gyri; I don' see the cerebellar cistern cyst.    6/16/22 thyroid US ([facility]):  Right thyroid nodule 2.6 x 2.1 x  3.5 cm hypoechoic heterogeneous -6/23/2022 FNAB  Right medial inferior  0.7 x 0.6x 1.2 cystic with small mural solid  Right inf medial  0.8 x 0.5 x 0.9 mixed   Left inferior mid posterior  0.3 x  0.2 x 0.4   Left posterior mid 0.3 x 0.3 x 0.5 hypoechoic  7/13/2022 neck US [facility]   7/18/2022 neck US  Right level 5 inferior # 1 0.7 x   Left level 3 # 1 0.3 x 0.2 x   Left level 3 # 2 0.4 x 0.6 x 1.1   7/27/2022 CT chest ([facility])    REVIEW OF SYSTEMS  No regain of lost weight  Appetite remains low  Voice is normal  Swallow is normal  Yawn, sneeze, cough causes tension in the neck.       Past Medical History  Past Medical History:   Diagnosis Date     Allergy-induced asthma      Arachnoid cyst of posterior cranial fossa 10/05/2021    3 cm,superior cerebellar cistern; presumptive imaging diagnosis     Gestational hypertension      HTN (hypertension)      Hyperlipidemia      Meningioma (H) 10/05/2021    presumptive imaging diagnsois; 14 mm , left temporal convexity     Postpartum cardiomyopathy 2002     Preeclampsia      Pulmonary nodules  2022     PVC's (premature ventricular contractions)      Sinusitis 2017     Past Surgical History:   Procedure Laterality Date      SECTION       GYN SURGERY       MAMMOPLASTY AUGMENTATION  2012     THYROIDECTOMY N/A 2022    Procedure: THYROIDECTOMY, TOTAL;  Surgeon: Sarita Miles MD;  Location: [facility] OR     wisdom teeth extraction       Medications    Current Outpatient Medications   Medication Sig Dispense Refill     calcitRIOL (ROCALTROL) 0.25 MCG capsule Take 1 capsule (0.25 mcg) by mouth daily 42 capsule 0     calcium carbonate (TUMS) 500 MG chewable tablet Take 5 tablets (2,500 mg) by mouth 3 times daily for 7 days, THEN 5 tablets (2,500 mg) 2 times daily for 7 days, THEN 5 tablets (2,500 mg) daily for 7 days. 210 tablet 0     levothyroxine (SYNTHROID/LEVOTHROID) 125 MCG tablet Take 1 tablet (125 mcg) by mouth daily for 60 days 60 tablet 0     lisinopril (ZESTRIL) 10 MG tablet TAKE 1 TABLET(10 MG) BY MOUTH EVERY DAY       loratadine (CLARITIN) 10 MG tablet Take 10 mg by mouth daily       metoprolol succinate ER (TOPROL XL) 50 MG 24 hr tablet TAKE 1 TABLET(50 MG) BY MOUTH EVERY DAY       Multiple Vitamin (ONE-A-DAY ESSENTIAL) TABS Take 1 tablet by mouth       Allergies  No Known Allergies    Family History  Family History   Problem Relation Age of Onset     Hypertension Mother      Hypertension Father      Breast Cancer Paternal Grandmother      Diabetes Paternal Aunt      Diabetes Paternal Aunt      Thyroid Cancer No family hx of      Thyroid Disease No family hx of      Social History  Social History     Tobacco Use     Smoking status: Never Smoker     Smokeless tobacco: Never Used   Substance Use Topics     Alcohol use: Yes     Comment: occ     Drug use: Never     Walks for exercise; eating 3 meals/day.      Physical Exam  LMP 2022 (Approximate)   There is no height or weight on file to calculate BMI.   BP Readings from Last 1 Encounters:   22 104/73      Pulse  "Readings from Last 1 Encounters:   08/26/22 69      Resp Readings from Last 1 Encounters:   08/18/22 15      Temp Readings from Last 1 Encounters:   08/18/22 98.8  F (37.1  C) (Oral)      SpO2 Readings from Last 1 Encounters:   08/18/22 100%      Wt Readings from Last 1 Encounters:   08/26/22 75.8 kg (167 lb)      Ht Readings from Last 1 Encounters:   08/26/22 1.676 m (5' 6\")     GENERAL: no distress; voice normal   SKIN: Visible skin clear. No significant rash, abnormal pigmentation or lesions.  EYES: Eyes grossly normal to inspection.  No discharge or erythema, or obvious scleral/conjunctival abnormalities.  NECK: no visible masses; low transverse surgical scar looks clean, no redness or swelling.   RESP: No audible wheeze, cough, or visible cyanosis.  No visible retractions or increased work of breathing.    NEURO:   Awake, alert, responds appropriately to questions.  Mentation and speech fluent.  PSYCH:affect normal,  appearance well-groomed.    DATA REVIEW     Latest Reference Range & Units 08/18/22 11:01 08/26/22 14:26   Calcium Ionized Whole Blood 4.4 - 5.2 mg/dL 4.6 4.9   Parathyroid Hormone Intact 15 - 65 pg/mL 11 (L) 13 (L)   (L): Data is abnormally low    Miriam Beverly is being evaluated via a billable video visit.        How would you like to obtain your AVS? MyChart  For the video visit, send the invitation by: Text to cell phone: [phone]  Will anyone else be joining your video visit? No      Cheyenne Valladares MD      "

## 2022-08-30 LAB
INTERPRETATION: NORMAL
LAB DIRECTOR COMMENTS: NORMAL
LAB DIRECTOR DISCLAIMER: NORMAL
LAB DIRECTOR INTERPRETATION: NORMAL
LAB DIRECTOR METHODOLOGY: NORMAL
LAB DIRECTOR RESULTS: NORMAL
PATH REPORT.COMMENTS IMP SPEC: NORMAL
PATH REPORT.FINAL DX SPEC: NORMAL
PATH REPORT.GROSS SPEC: NORMAL
PATH REPORT.MICROSCOPIC SPEC OTHER STN: NORMAL
PATH REPORT.RELEVANT HX SPEC: NORMAL
PHOTO IMAGE: NORMAL
SIGNIFICANT RESULTS: NORMAL
SPECIMEN DESCRIPTION: NORMAL
SPECIMEN DESCRIPTION: NORMAL
TEST DETAILS, MDL: NORMAL

## 2022-08-30 PROCEDURE — G0452 MOLECULAR PATHOLOGY INTERPR: HCPCS | Mod: 26 | Performed by: PATHOLOGY

## 2022-08-31 ENCOUNTER — OFFICE VISIT (OUTPATIENT)
Dept: NEUROSURGERY | Facility: CLINIC | Age: 52
End: 2022-08-31
Payer: COMMERCIAL

## 2022-08-31 ENCOUNTER — PRE VISIT (OUTPATIENT)
Dept: NEUROSURGERY | Facility: CLINIC | Age: 52
End: 2022-08-31

## 2022-08-31 ENCOUNTER — TELEPHONE (OUTPATIENT)
Dept: OTOLARYNGOLOGY | Facility: CLINIC | Age: 52
End: 2022-08-31

## 2022-08-31 VITALS
RESPIRATION RATE: 16 BRPM | SYSTOLIC BLOOD PRESSURE: 109 MMHG | BODY MASS INDEX: 27.71 KG/M2 | DIASTOLIC BLOOD PRESSURE: 77 MMHG | OXYGEN SATURATION: 98 % | HEART RATE: 66 BPM | WEIGHT: 171.7 LBS

## 2022-08-31 DIAGNOSIS — D32.9 MENINGIOMA (H): Primary | ICD-10-CM

## 2022-08-31 PROCEDURE — 81455 SO/HL 51/>GSAP DNA/DNA&RNA: CPT | Performed by: OTOLARYNGOLOGY

## 2022-08-31 PROCEDURE — 99203 OFFICE O/P NEW LOW 30 MIN: CPT | Performed by: PHYSICIAN ASSISTANT

## 2022-08-31 PROCEDURE — 81445 SO NEO GSAP 5-50DNA/DNA&RNA: CPT | Performed by: OTOLARYNGOLOGY

## 2022-08-31 ASSESSMENT — PAIN SCALES - GENERAL: PAINLEVEL: NO PAIN (0)

## 2022-08-31 NOTE — PATIENT INSTRUCTIONS
Follow up with DR. WILDE in 6 months, with MRI prior to appointment please.    Don't hesitate to reach out sooner with any new questions or concerns

## 2022-08-31 NOTE — LETTER
"2022       RE: Miriam Beverly  [address]  [facility] [address]     Dear Colleague,    Thank you for referring your patient, Miriam Beverly, to the [facility] NEUROSURGERY [facility] [place] at [facility]. Please see a copy of my visit note below.      [facility]  Department of Neurosurgery  Center for Skull Base and Pituitary Surgery    Name: Miriam Beverly  MRN: [id number]  Age: 52 year old  : 2022      Chief Complaint:   Left temporal meningioma, new patient consult    History of Present Illness:   Miriam Beverly is a 52 year old female with a history of gestational hypertension with secondary cardiomyopathy and thyroid nodule with recent total thyroidectomy who is here today as a new patient for consult regarding a left temporal meningioma. This was discovered last 2021, she reports developing \"weird symptoms in both legs\" with tingling and prickly sensations along with pain, trouble with her , and also some subtle word finding issues. These symptoms have improved; she finds she has some difficulty with words more than she used to but it's mild compared to last year. She denies new headaches, history of seizures, paresthesias, weakness, dizziness, or vision changes.   She had a recent total thyroidectomy with Dr. 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   She has one son, age 20. Has a partner whom she lives with.   Nonsmoker.    Review of Systems:   Pertinent items are noted in HPI or as in patient entered ROS below, remainder of complete ROS is negative.     Active Medications:     Current Outpatient Medications:      calcitRIOL (ROCALTROL) 0.25 MCG capsule, Take 1 capsule (0.25 mcg) by mouth daily, Disp: 42 capsule, Rfl: 0     calcium " carbonate (TUMS) 500 MG chewable tablet, Take 5 tablets (2,500 mg) by mouth 3 times daily for 7 days, THEN 5 tablets (2,500 mg) 2 times daily for 7 days, THEN 5 tablets (2,500 mg) daily for 7 days., Disp: 210 tablet, Rfl: 0     levothyroxine (SYNTHROID/LEVOTHROID) 125 MCG tablet, Take 1 tablet (125 mcg) by mouth daily for 60 days, Disp: 60 tablet, Rfl: 0     lisinopril (ZESTRIL) 10 MG tablet, TAKE 1 TABLET(10 MG) BY MOUTH EVERY DAY, Disp: , Rfl:      loratadine (CLARITIN) 10 MG tablet, Take 10 mg by mouth daily, Disp: , Rfl:      metoprolol succinate ER (TOPROL XL) 50 MG 24 hr tablet, TAKE 1 TABLET(50 MG) BY MOUTH EVERY DAY, Disp: , Rfl:      Multiple Vitamin (ONE-A-DAY ESSENTIAL) TABS, Take 1 tablet by mouth, Disp: , Rfl:       Allergies:   Patient has no known allergies.      Past Medical History:  Past Medical History:   Diagnosis Date     Allergy-induced asthma      Arachnoid cyst of posterior cranial fossa 10/05/2021    3 cm,superior cerebellar cistern; presumptive imaging diagnosis     Gestational hypertension      HTN (hypertension)      Hyperlipidemia      Meningioma (H) 10/05/2021    presumptive imaging diagnsois; 14 mm , left temporal convexity     Postpartum cardiomyopathy      Preeclampsia      Pulmonary nodules 2022     PVC's (premature ventricular contractions)      Sinusitis 2017     Patient Active Problem List   Diagnosis     Thyroid nodule     Abnormal cytology     Pulmonary nodules     Postoperative hypothyroidism     Low serum parathyroid hormone (PTH)        Past Surgical History:  Past Surgical History:   Procedure Laterality Date      SECTION       GYN SURGERY       MAMMOPLASTY AUGMENTATION  2012     THYROIDECTOMY N/A 2022    Procedure: THYROIDECTOMY, TOTAL;  Surgeon: Sarita Miles MD;  Location: [facility] OR     wisdom teeth extraction         Family History:   Family History   Problem Relation Age of Onset     Hypertension Mother      Hypertension Father       Breast Cancer Paternal Grandmother      Diabetes Paternal Aunt      Diabetes Paternal Aunt      Thyroid Cancer No family hx of      Thyroid Disease No family hx of          Social History:   Social History     Tobacco Use     Smoking status: Never Smoker     Smokeless tobacco: Never Used   Substance Use Topics     Alcohol use: Yes     Comment: occ     Drug use: Never        Physical Exam:   /77   Pulse 66   Resp 16   Wt 77.9 kg (171 lb 11.2 oz)   LMP 07/28/2022 (Approximate)   SpO2 98%   BMI 27.71 kg/m     General: No acute distress.   Eyes: Conjunctivae are normal.  MSK: Moves all extremities.  No obvious deformity.  Neuro: The patient is fully oriented. Speech is normal. Extraocular movements are intact without nystagmus. Facial sensation is intact in V1, V2, V3 distributions. Facial nerve function is normal, rated as a House Brackmann 1. Shoulders are full strength. There is no pronator drift. Full strength in all extremities. Sensation intact throughout. No dysmetria with finger-nose-finger testing. Gait is normal with normal heel-toe walking.   Psych: Normal mood and affect. Behavior is normal.      Imaging:  MRI performed 8/10/22 reviewed today which shows a 1.8 x 1.1 x 1.4cm mass in the left temporal region arising from the dura. There is perhaps subtle small FLAIR along the medial aspect of the lesion.       Assessment:  Left temporal meningioma, new patient consult    Plan:  We discussed imaging which appears stable. Given the questionable FLAIR will plan a 6 month follow up with repeat MRI. Discussed with Dr. Guallpa today who reviewed as well and agrees. The patient was encouraged to reach out sooner with new questions or concerns.      Sherley Cox PA-C  Department of Neurosurgery  Center for Skull Base and Pituitary Surgery  [facility]

## 2022-08-31 NOTE — PROGRESS NOTES
"  [facility]  Department of Neurosurgery  Center for Skull Base and Pituitary Surgery    Name: Miriam Beverly  MRN: [id number]  Age: 52 year old  : 2022      Chief Complaint:   Left temporal meningioma, new patient consult    History of Present Illness:   Miriam Beverly is a 52 year old female with a history of gestational hypertension with secondary cardiomyopathy and thyroid nodule with recent total thyroidectomy who is here today as a new patient for consult regarding a left temporal meningioma. This was discovered last 2021, she reports developing \"weird symptoms in both legs\" with tingling and prickly sensations along with pain, trouble with her , and also some subtle word finding issues. These symptoms have improved; she finds she has some difficulty with words more than she used to but it's mild compared to last year. She denies new headaches, history of seizures, paresthesias, weakness, dizziness, or vision changes.   She had a recent total thyroidectomy with Dr. 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 Has a partner whom she lives with.   Nonsmoker.    Review of Systems:   Pertinent items are noted in HPI or as in patient entered ROS below, remainder of complete ROS is negative.     Active Medications:     Current Outpatient Medications:      calcitRIOL (ROCALTROL) 0.25 MCG capsule, Take 1 capsule (0.25 mcg) by mouth daily, Disp: 42 capsule, Rfl: 0     calcium carbonate (TUMS) 500 MG chewable tablet, Take 5 tablets (2,500 mg) by mouth 3 times daily for 7 days, THEN 5 tablets (2,500 mg) 2 times daily for 7 days, THEN 5 tablets (2,500 mg) daily for 7 days., Disp: 210 tablet, Rfl: 0     levothyroxine (SYNTHROID/LEVOTHROID) 125 MCG tablet, Take 1 tablet (125 mcg) by mouth daily " for 60 days, Disp: 60 tablet, Rfl: 0     lisinopril (ZESTRIL) 10 MG tablet, TAKE 1 TABLET(10 MG) BY MOUTH EVERY DAY, Disp: , Rfl:      loratadine (CLARITIN) 10 MG tablet, Take 10 mg by mouth daily, Disp: , Rfl:      metoprolol succinate ER (TOPROL XL) 50 MG 24 hr tablet, TAKE 1 TABLET(50 MG) BY MOUTH EVERY DAY, Disp: , Rfl:      Multiple Vitamin (ONE-A-DAY ESSENTIAL) TABS, Take 1 tablet by mouth, Disp: , Rfl:       Allergies:   Patient has no known allergies.      Past Medical History:  Past Medical History:   Diagnosis Date     Allergy-induced asthma      Arachnoid cyst of posterior cranial fossa 10/05/2021    3 cm,superior cerebellar cistern; presumptive imaging diagnosis     Gestational hypertension      HTN (hypertension)      Hyperlipidemia      Meningioma (H) 10/05/2021    presumptive imaging diagnsois; 14 mm , left temporal convexity     Postpartum cardiomyopathy      Preeclampsia      Pulmonary nodules 2022     PVC's (premature ventricular contractions)      Sinusitis 2017     Patient Active Problem List   Diagnosis     Thyroid nodule     Abnormal cytology     Pulmonary nodules     Postoperative hypothyroidism     Low serum parathyroid hormone (PTH)        Past Surgical History:  Past Surgical History:   Procedure Laterality Date      SECTION       GYN SURGERY       MAMMOPLASTY AUGMENTATION  2012     THYROIDECTOMY N/A 2022    Procedure: THYROIDECTOMY, TOTAL;  Surgeon: Sarita Miles MD;  Location: [facility] OR     wisdom teeth extraction         Family History:   Family History   Problem Relation Age of Onset     Hypertension Mother      Hypertension Father      Breast Cancer Paternal Grandmother      Diabetes Paternal Aunt      Diabetes Paternal Aunt      Thyroid Cancer No family hx of      Thyroid Disease No family hx of          Social History:   Social History     Tobacco Use     Smoking status: Never Smoker     Smokeless tobacco: Never Used   Substance Use Topics      Alcohol use: Yes     Comment: occ     Drug use: Never        Physical Exam:   /77   Pulse 66   Resp 16   Wt 77.9 kg (171 lb 11.2 oz)   LMP 07/28/2022 (Approximate)   SpO2 98%   BMI 27.71 kg/m     General: No acute distress.   Eyes: Conjunctivae are normal.  MSK: Moves all extremities.  No obvious deformity.  Neuro: The patient is fully oriented. Speech is normal. Extraocular movements are intact without nystagmus. Facial sensation is intact in V1, V2, V3 distributions. Facial nerve function is normal, rated as a House Brackmann 1. Shoulders are full strength. There is no pronator drift. Full strength in all extremities. Sensation intact throughout. No dysmetria with finger-nose-finger testing. Gait is normal with normal heel-toe walking.   Psych: Normal mood and affect. Behavior is normal.      Imaging:  MRI performed 8/10/22 reviewed today which shows a 1.8 x 1.1 x 1.4cm mass in the left temporal region arising from the dura. There is perhaps subtle small FLAIR along the medial aspect of the lesion.       Assessment:  Left temporal meningioma, new patient consult    Plan:  We discussed imaging which appears stable. Given the questionable FLAIR will plan a 6 month follow up with repeat MRI. Discussed with Dr. Guallpa today who reviewed as well and agrees. The patient was encouraged to reach out sooner with new questions or concerns.    Sherley Cox PA-C  Department of Neurosurgery  Center for Skull Base and Pituitary Surgery  [facility]

## 2022-09-01 NOTE — TELEPHONE ENCOUNTER
Called patient and reviewed pathology results.    A.  LYMPH NODES, DELPHIAN NODE AND PRETRACHEAL LEVEL 6, EXCISION:  -Four benign lymph nodes.     B.  THYROID GLAND, TOTAL THYROIDECTOMY:  -Follicular adenoma with Hurthle cell changes, located within right lobe, 4.2 cm in greatest dimension.  -The neoplasm is encapsulated with no capsular or vascular invasion identified.  -Benign colloid nodules present in both lobes.  -No evidence of malignancy identified, see comment.      Sarita Miles MD    Department of Otolaryngology

## 2022-09-15 ENCOUNTER — LAB (OUTPATIENT)
Dept: LAB | Facility: CLINIC | Age: 52
End: 2022-09-15
Payer: COMMERCIAL

## 2022-09-15 DIAGNOSIS — E89.0 POSTOPERATIVE HYPOTHYROIDISM: ICD-10-CM

## 2022-09-15 DIAGNOSIS — R89.6 ABNORMAL CYTOLOGY: ICD-10-CM

## 2022-09-15 DIAGNOSIS — R79.89 LOW SERUM PARATHYROID HORMONE (PTH): ICD-10-CM

## 2022-09-15 LAB
CALCIUM SERPL-MCNC: 8.7 MG/DL (ref 8.5–10.1)
PHOSPHATE SERPL-MCNC: 3.1 MG/DL (ref 2.5–4.5)
T4 FREE SERPL-MCNC: 1.2 NG/DL (ref 0.76–1.46)
TSH SERPL DL<=0.005 MIU/L-ACNC: 1.09 MU/L (ref 0.4–4)

## 2022-09-15 PROCEDURE — 82310 ASSAY OF CALCIUM: CPT

## 2022-09-15 PROCEDURE — 84439 ASSAY OF FREE THYROXINE: CPT

## 2022-09-15 PROCEDURE — 36415 COLL VENOUS BLD VENIPUNCTURE: CPT

## 2022-09-15 PROCEDURE — 84443 ASSAY THYROID STIM HORMONE: CPT

## 2022-09-15 PROCEDURE — 84432 ASSAY OF THYROGLOBULIN: CPT | Mod: 90

## 2022-09-15 PROCEDURE — 99000 SPECIMEN HANDLING OFFICE-LAB: CPT

## 2022-09-15 PROCEDURE — 84100 ASSAY OF PHOSPHORUS: CPT

## 2022-09-15 PROCEDURE — 83970 ASSAY OF PARATHORMONE: CPT

## 2022-09-15 PROCEDURE — 86800 THYROGLOBULIN ANTIBODY: CPT | Mod: 90

## 2022-09-17 LAB — PTH-INTACT SERPL-MCNC: 44 PG/ML (ref 15–65)

## 2022-09-26 ENCOUNTER — VIRTUAL VISIT (OUTPATIENT)
Dept: ENDOCRINOLOGY | Facility: CLINIC | Age: 52
End: 2022-09-26
Payer: COMMERCIAL

## 2022-09-26 DIAGNOSIS — D34 FOLLICULAR ADENOMA OF THYROID GLAND: ICD-10-CM

## 2022-09-26 DIAGNOSIS — E89.0 POSTOPERATIVE HYPOTHYROIDISM: Primary | ICD-10-CM

## 2022-09-26 PROBLEM — E04.1 THYROID NODULE: Status: RESOLVED | Noted: 2022-08-03 | Resolved: 2022-09-26

## 2022-09-26 PROBLEM — R79.89 LOW SERUM PARATHYROID HORMONE (PTH): Status: RESOLVED | Noted: 2022-08-29 | Resolved: 2022-09-26

## 2022-09-26 PROBLEM — R89.6 ABNORMAL CYTOLOGY: Status: RESOLVED | Noted: 2022-08-03 | Resolved: 2022-09-26

## 2022-09-26 PROCEDURE — 99214 OFFICE O/P EST MOD 30 MIN: CPT | Mod: 95

## 2022-09-26 RX ORDER — LEVOTHYROXINE SODIUM 125 UG/1
125 TABLET ORAL DAILY
Qty: 90 TABLET | Refills: 4 | Status: SHIPPED | OUTPATIENT
Start: 2022-09-26 | End: 2023-09-26 | Stop reason: DRUGHIGH

## 2022-09-26 NOTE — PATIENT INSTRUCTIONS
Stop calcitriol    Continue levothyroxine 125 mcg/day.  Repeat labs yearly for life.      Get back to your primary care doctor about the lung nodules.  Pulmonary consultation was recommended by radiologist after there 7/27 CT

## 2022-09-26 NOTE — LETTER
9/26/2022       RE: Miriam Beverly  [address]  [facility] [address]     Dear Colleague,    Thank you for referring your patient, Miriam Beverly, to the [facility] [place] at [facility]. Please see a copy of my visit note below.    Miriam is a 52 year old who is being evaluated via a billable video visit.      How would you like to obtain your AVS? MyChart  If the video visit is dropped, the invitation should be resent by: Send to e-mail at: [email or web address]  Will anyone else be joining your video visit? No        Video-Visit Details  Type of service:  Video Visit    Distant Location (provider location):  [facility] [place]     Platform used for Video Visit: [employer]  Endocrine video visit note--     Attending Assessment/Plan :     1.  Post surgical hypothyroidism. On LT4 125 mcg/day (1.65 mcg/kg/day)  Recent labs at [facility] with normal TSh on LT4 125 mcg/day  Labs could be any time between 4 and 12 months from now.  Standing orders are on the system .  RTC approx 1 year or sooner prn.  LT4 Rx change to 90 day supply with 4 refills   If stable we will send her back to primary care for longer term followup after that.     Low PTH post op - Resolved on 9/15  Labs.  Stop calcitriol    2. S/p removal of follicular adenoma. No fusions or mutation on surgical specimen.  No further work up is needed.     3  Pulmonary nodules noted right lung 6/2022 and 7/22 imaging   refer back to primary care .  Pulmonary consultation was recommended by radiologist after there 7/27 CT     4.  Brain masses refer back to primary care   A) extraaxial mass presumed meningioma  B) superior cerebellar cistern mass presumed arachnoid cyst    Perimenopause by age     Due to the COVID 19 pandemic this visit was a video visit. The patient gave verbal consent for the visit today.    I have independently reviewed and interpreted  labs, imaging as indicated.     Chart review/prep time 1  [id number]  Visit Start time  1554   Visit Stop time  1600   _22_ minutes spent on the date of the encounter doing chart review, history and exam, documentation and further activities as noted above.    Cheyenne Valladares MD    Chief complaint: HISTORY OF PRESENT ILLNESS  Miriam presents for follow up of of recent thyroid surgery.  I just saw her 8/29 but we didn't have the surgical pathology at the time.  Since then, the results came in and she has also had additional labs.     Right thyroid enlargement was felt on examination 9/29/2021.   FNAB of the 3.5 cm right dominant nodule was read as suspicious for papillary thyroid carcinoma.   Treatment has been as follows:   8/18/2022 total thyroidectomy (Dr Miles) . 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      She is off both calcitriol and calcium at this time. She was off x 2 weeks, before the most recent  blood work.     Endocrine relevant labs are as follows:  5/16/13 TSH 1.24  9/29/2021 TSh 1.45  8/18/2022 PTH 11, iCa 4.6  8/26/2022 PTH 13, iCa 4.9  9/15/2022 TSH 1.09, free T4 1.2, Ca 8.7, phos 3.1,PTH 44  ; after appt result Tg 0.38, CODY < 0.4     Relevant imaging is as follows: (as read by me as it pertains to endocrine relevant organs)  6/16/22 thyroid US ([facility]):  Right thyroid nodule 2.6 x 2.1 x  3.5 cm hypoechoic heterogeneous -6/23/2022 FNAB  Right medial inferior  0.7 x 0.6x 1.2 cystic with small mural solid  Right inf medial  0.8 x 0.5 x 0.9 mixed   Left inferior mid posterior  0.3 x  0.2 x 0.4   Left posterior mid 0.3 x 0.3 x 0.5 hypoechoic  7/13/2022 neck US [facility]   7/18/2022 neck US  Right level 5 inferior # 1 0.7 x   Left level 3 # 1 0.3 x 0.2 x   Left level 3 # 2 0.4 x 0.6 x 1.1   7/27/2022 CT chest ([facility]) multiple clustered nodular and  tubular opacities at the extreme anterior/inferior right lung base; similar to  study.      REVIEW OF SYSTEMS  Feels OK   Relieved its not cancer - OK with loss of thyroid to resolve the worry    Past Medical History  Past Medical History:   Diagnosis Date     Allergy-induced asthma      Arachnoid cyst of posterior cranial fossa 10/05/2021    3 cm,superior cerebellar cistern; presumptive imaging diagnosis     Gestational hypertension      HTN (hypertension)      Hyperlipidemia      Meningioma (H) 10/05/2021    presumptive imaging diagnsois; 14 mm , left temporal convexity     Postpartum cardiomyopathy      Preeclampsia      Pulmonary nodules 2022     PVC's (premature ventricular contractions)      Sinusitis 2017     Past Surgical History:   Procedure Laterality Date      SECTION       GYN SURGERY       MAMMOPLASTY AUGMENTATION  2012     THYROIDECTOMY N/A 2022    Procedure: THYROIDECTOMY, TOTAL;  Surgeon: Sarita Miles MD;  Location: [facility] OR     wisdom teeth extraction       Medications    Current Outpatient Medications   Medication Sig Dispense Refill     calcitRIOL (ROCALTROL) 0.25 MCG capsule Take 1 capsule (0.25 mcg) by mouth daily 42 capsule 0     levothyroxine (SYNTHROID/LEVOTHROID) 125 MCG tablet Take 1 tablet (125 mcg) by mouth daily for 60 days 60 tablet 0     lisinopril (ZESTRIL) 10 MG tablet TAKE 1 TABLET(10 MG) BY MOUTH EVERY DAY       loratadine (CLARITIN) 10 MG tablet Take 10 mg by mouth daily       metoprolol succinate ER (TOPROL XL) 50 MG 24 hr tablet TAKE 1 TABLET(50 MG) BY MOUTH EVERY DAY       Multiple Vitamin (ONE-A-DAY ESSENTIAL) TABS Take 1 tablet by mouth       Allergies  No Known Allergies    Family History  Family History   Problem Relation Age of Onset     Hypertension Mother      Hypertension Father      Breast Cancer Paternal Grandmother      Diabetes Paternal Aunt      Diabetes Paternal Aunt      Thyroid Cancer No family hx of      Thyroid Disease  "No family hx of      Social History  Social History     Tobacco Use     Smoking status: Never Smoker     Smokeless tobacco: Never Used   Substance Use Topics     Alcohol use: Yes     Comment: occ     Drug use: Never     Walks for exercise; eating 3 meals/day.      Physical Exam  LMP 07/28/2022 (Approximate)   There is no height or weight on file to calculate BMI.   BP Readings from Last 1 Encounters:   08/31/22 109/77      Pulse Readings from Last 1 Encounters:   08/31/22 66      Resp Readings from Last 1 Encounters:   08/31/22 16      Temp Readings from Last 1 Encounters:   08/18/22 98.8  F (37.1  C) (Oral)      SpO2 Readings from Last 1 Encounters:   08/31/22 98%      Wt Readings from Last 1 Encounters:   08/31/22 77.9 kg (171 lb 11.2 oz)      Ht Readings from Last 1 Encounters:   08/26/22 1.676 m (5' 6\")     GENERAL: no distress-- I can see from mid chest up; voice normal   SKIN: Visible skin clear. No significant rash, abnormal pigmentation or lesions.  EYES: Eyes grossly normal to inspection.    NECK: no visible masses;   RESP: No audible wheeze, cough, or  increased work of breathing.    NEURO:   Awake, alert, responds appropriately to questions.  Mentation and speech fluent.  PSYCH:affect normal,  appearance well-groomed.    DATA REVIEW    Surgical Pathology Exam: [id number]  Order: [id number]   Collected 8/18/2022  8:09 AM     Status: Final result     Visible to patient: Yes (seen)     0 Result Notes    Component  Resulting Agency   Case Report   Surgical Pathology Report                         Case: [id number]                                   Authorizing Provider:  Sarita Miles MD     Collected:           08/18/2022 08:09 AM           Ordering Location:      MAIN OR                 Received:            08/18/2022 10:38 AM           Pathologist:           Richelle Rubio MD                                                    Specimens:   A) - Other, Delphian Node and Pretracheal level 6     " "                                               B) - Other, Total thyroidectomy Stitch on Right side                                       Final Diagnosis   A.  LYMPH NODES, DELPHIAN NODE AND PRETRACHEAL LEVEL 6, EXCISION:  -Four benign lymph nodes.     B.  THYROID GLAND, TOTAL THYROIDECTOMY:  -Follicular adenoma with Hurthle cell changes, located within right lobe, 4.2 cm in greatest dimension.  -The neoplasm is encapsulated with no capsular or vascular invasion identified.  -Benign colloid nodules present in both lobes.  -No evidence of malignancy identified, see comment.   Electronically signed by Richelle Rubio MD on 8/30/2022 at  4:47 PM   Comment  [facility]   Thyroid NGS panel on follicular adenoma is in progress and results will be provided separately.   Clinical Information  [facility]   Procedure:  THYROIDECTOMY, TOTAL  Pre-op Diagnosis: Papillary thyroid carcinoma (H) [C73]  Post-op Diagnosis: C73 - Papillary thyroid carcinoma (H) [ICD-10-CM]     51 year old woman with a likely papillary thyroid cancer. She had a thyroid U/S on 6/16/2022 which showed the right lobe measured 5.4 x 3.0 x 2.1 cm, several small cysts up to 1.3 cm, TIRADS 4 nodule measuring 3.5 x 2.6 x 2.1 (report says 8.5 cm but impression indicates 3.5 cm), left lobe measured 5.6 x 1.3 x 1.0 cm with small cysts measuring up to 5 mm.  She had a thyroid biopsy on 6/23/2022 which was suspicious for papillary thyroid cancer. She has elected to proceed with total thyroidectomy.   Gross Description  GRACIE   A(1). Other, Delphian Node and Pretracheal level 6:  The specimen is received in formalin with proper patient identification, labeled \"delphian node and pretracheal level 6\".  The specimen consists of 2 pieces of white-tan soft tissue ranging from 0.9 to 1.2 cm in greatest dimension.  5 potential lymph nodes are resected and submitted in A1.  Representative sections submitted      B(2). Other, Total thyroidectomy Stitch on Right side :  The " "specimen is received fresh with proper patient identification labeled \"total thyroidectomy stitch on right side\".  The specimen consists of 6.5 x 6.5 x 3.2 cm in greatest dimension thyroid tissue weighing 31.91 g.  The surface of the tissue is purple-tan soft tissue without any nodules or focal areas of hemorrhage.  Left lobe measures 5.3 cm superior to posterior, 1.4 cm anterior to posterior, 2.4 cm transversely.  Right lobe 5.7 cm superior to posterior, 2.7 cm anterior to posterior 3.0 cm transversely.  Pyramidal lobe is absent.  Thyroid capsule is intact.  Anterior surface of the thyroid is inked in blue, posterior surface of the thyroid is inked in black and serially section.  Serially sectioning thyroid from superior to inferior reveals parenchyma as dark brown soft tissue     Left lobe has an encapsulated nodule measuring 1.1 x 0.9 x 0.7 cm measuring 2 cm from the isthmus and the left superior lobe.  The capsule buts anterior and posterior margins.     Right lobe has a well encapsulated mass pink yellow soft tissue measuring 4.2 (superior to inferior) x 2.5 (anterior to posterior) x 2.5 (transversely) cm in greatest dimension with capsule abutting anterior and posterior margins but does not invade the thyroid capsule. Additionally, there is a nodule present in the lower right thyroid measuring 0.8(superior to inferior) x 0.6 x 0.6 cm     Remaining parenchyma is beefy brown soft tissue.     Representative sections submitted  Summary of sections  B1: Nodule from the left lobe  B2: Normal parenchyma from the left lobe  B3-B12: Entire mass submitted in sections  B13: Nodule from the right lobe.  B14:Normal parenchyma from right lobe                        Microscopic Description  [facility]   Microscopic examination performed.  Immunohistochemistry studies, performed appropriate controls on block B5, demonstrate that the cells comprising the follicular adenoma are negative for HBME-1 and BRAF V600E, while focally " positive for CK19.  The immunophenotype supports the interpretation.   Performing Labs  [facility]   The technical component of this testing was completed at [facility]          Thyroid Panel Focused NGS Panel: [id number]  Order: [id number]   Collected 8/30/2022  7:15 PM     Status: Final result     Visible to patient: No (scheduled for 9/22/2022  4:35 PM)     0 Result Notes    Component    Significant Results    Detected Alterations of Known or Potential Pathogenicity: None     Detected Alterations of Uncertain Significance: None     Genes with No Detected Clinically Significant Alterations: BRAF, HRAS, KRAS, NRAS, RET   Interpretation    No mutations were identified in the analyzed genes.          NGS Fusion Profile Assay: [id number]  Order: [id number]   Collected 8/30/2022  7:15 PM     Status: Final result     Visible to patient: No (scheduled for 9/19/2022  4:52 PM)     0 Result Notes    Component    Specimen Description    Tissue: Fixed slides-Collected 8/18/22, Thyroid, [id number] B5  [id number] B5   RESULTS    Fusion Event: NEGATIVE           Latest Reference Range & Units 08/26/22 14:26 08/30/22 19:15 09/15/22 07:49   Calcium 8.5 - 10.1 mg/dL   8.7   Phosphorus 2.5 - 4.5 mg/dL   3.1   Calcium Ionized Whole Blood 4.4 - 5.2 mg/dL 4.9     T4 Free 0.76 - 1.46 ng/dL   1.20   TSH 0.40 - 4.00 mU/L   1.09   NGS FUSION PROFILE ASSAY   Rpt    Parathyroid Hormone Intact 15 - 65 pg/mL 13 (L)  44   (L): Data is abnormally low  Rpt: View report in Results Review for more information

## 2022-09-26 NOTE — PROGRESS NOTES
Miriam is a 52 year old who is being evaluated via a billable video visit.      How would you like to obtain your AVS? MyChart  If the video visit is dropped, the invitation should be resent by: Send to e-mail at: [email or web address]  Will anyone else be joining your video visit? No        Video-Visit Details  Type of service:  Video Visit    Distant Location (provider location):  [facility] ENDOCRINOLOG[facility] [place]     Platform used for Video Visit: [employer]  Endocrine video visit note--     Attending Assessment/Plan :     1.  Post surgical hypothyroidism. On LT4 125 mcg/day (1.65 mcg/kg/day)  Recent labs at [facility] with normal TSh on LT4 125 mcg/day  Labs could be any time between 4 and 12 months from now.  Standing orders are on the system .  RTC approx 1 year or sooner prn.  LT4 Rx change to 90 day supply with 4 refills   If stable we will send her back to primary care for longer term followup after that.     Low PTH post op - Resolved on 9/15  Labs.  Stop calcitriol    2. S/p removal of follicular adenoma. No fusions or mutation on surgical specimen.  No further work up is needed.     3  Pulmonary nodules noted right lung 6/2022 and 7/22 imaging   refer back to primary care .  Pulmonary consultation was recommended by radiologist after there 7/27 CT     4.  Brain masses refer back to primary care   A) extraaxial mass presumed meningioma  B) superior cerebellar cistern mass presumed arachnoid cyst    Perimenopause by age     Due to the COVID 19 pandemic this visit was a video visit. The patient gave verbal consent for the visit today.    I have independently reviewed and interpreted labs, imaging as indicated.     Chart review/prep time 1  [id number]  Visit Start time  1554   Visit Stop time  1600   _22_ minutes spent on the date of the encounter doing chart review, history and exam, documentation and further activities as noted above.    Cheyenne Valladares MD    Chief complaint: HISTORY OF PRESENT  KHARI Pineda presents for follow up of of recent thyroid surgery.  I just saw her 8/29 but we didn't have the surgical pathology at the time.  Since then, the results came in and she has also had additional labs.     Right thyroid enlargement was felt on examination 9/29/2021.   FNAB of the 3.5 cm right dominant nodule was read as suspicious for papillary thyroid carcinoma.   Treatment has been as follows:   8/18/2022 total thyroidectomy (Dr Miles) . 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      She is off both calcitriol and calcium at this time. She was off x 2 weeks, before the most recent  blood work.     Endocrine relevant labs are as follows:  5/16/13 TSH 1.24  9/29/2021 TSh 1.45  8/18/2022 PTH 11, iCa 4.6  8/26/2022 PTH 13, iCa 4.9  9/15/2022 TSH 1.09, free T4 1.2, Ca 8.7, phos 3.1,PTH 44  ; after appt result Tg 0.38, CODY < 0.4     Relevant imaging is as follows: (as read by me as it pertains to endocrine relevant organs)  6/16/22 thyroid US ([facility]):  Right thyroid nodule 2.6 x 2.1 x  3.5 cm hypoechoic heterogeneous -6/23/2022 FNAB  Right medial inferior  0.7 x 0.6x 1.2 cystic with small mural solid  Right inf medial  0.8 x 0.5 x 0.9 mixed   Left inferior mid posterior  0.3 x  0.2 x 0.4   Left posterior mid 0.3 x 0.3 x 0.5 hypoechoic  7/13/2022 neck US [facility]   7/18/2022 neck US  Right level 5 inferior # 1 0.7 x   Left level 3 # 1 0.3 x 0.2 x   Left level 3 # 2 0.4 x 0.6 x 1.1   7/27/2022 CT chest ([facility]) multiple clustered nodular and tubular opacities at the extreme anterior/inferior right lung base; similar to 6/20 study.      REVIEW OF SYSTEMS  Feels OK   Relieved its not cancer - OK with loss of thyroid to resolve the worry    Past Medical History  Past Medical History:   Diagnosis Date     Allergy-induced asthma      Arachnoid cyst of posterior  cranial fossa 10/05/2021    3 cm,superior cerebellar cistern; presumptive imaging diagnosis     Gestational hypertension      HTN (hypertension)      Hyperlipidemia      Meningioma (H) 10/05/2021    presumptive imaging diagnsois; 14 mm , left temporal convexity     Postpartum cardiomyopathy      Preeclampsia      Pulmonary nodules 2022     PVC's (premature ventricular contractions)      Sinusitis 2017     Past Surgical History:   Procedure Laterality Date      SECTION       GYN SURGERY       MAMMOPLASTY AUGMENTATION  2012     THYROIDECTOMY N/A 2022    Procedure: THYROIDECTOMY, TOTAL;  Surgeon: Sarita Miles MD;  Location: [facility] OR     wisdom teeth extraction       Medications    Current Outpatient Medications   Medication Sig Dispense Refill     calcitRIOL (ROCALTROL) 0.25 MCG capsule Take 1 capsule (0.25 mcg) by mouth daily 42 capsule 0     levothyroxine (SYNTHROID/LEVOTHROID) 125 MCG tablet Take 1 tablet (125 mcg) by mouth daily for 60 days 60 tablet 0     lisinopril (ZESTRIL) 10 MG tablet TAKE 1 TABLET(10 MG) BY MOUTH EVERY DAY       loratadine (CLARITIN) 10 MG tablet Take 10 mg by mouth daily       metoprolol succinate ER (TOPROL XL) 50 MG 24 hr tablet TAKE 1 TABLET(50 MG) BY MOUTH EVERY DAY       Multiple Vitamin (ONE-A-DAY ESSENTIAL) TABS Take 1 tablet by mouth       Allergies  No Known Allergies    Family History  Family History   Problem Relation Age of Onset     Hypertension Mother      Hypertension Father      Breast Cancer Paternal Grandmother      Diabetes Paternal Aunt      Diabetes Paternal Aunt      Thyroid Cancer No family hx of      Thyroid Disease No family hx of      Social History  Social History     Tobacco Use     Smoking status: Never Smoker     Smokeless tobacco: Never Used   Substance Use Topics     Alcohol use: Yes     Comment: occ     Drug use: Never     Walks for exercise; eating 3 meals/day.      Physical Exam  LMP 2022 (Approximate)   There is  "no height or weight on file to calculate BMI.   BP Readings from Last 1 Encounters:   08/31/22 109/77      Pulse Readings from Last 1 Encounters:   08/31/22 66      Resp Readings from Last 1 Encounters:   08/31/22 16      Temp Readings from Last 1 Encounters:   08/18/22 98.8  F (37.1  C) (Oral)      SpO2 Readings from Last 1 Encounters:   08/31/22 98%      Wt Readings from Last 1 Encounters:   08/31/22 77.9 kg (171 lb 11.2 oz)      Ht Readings from Last 1 Encounters:   08/26/22 1.676 m (5' 6\")     GENERAL: no distress-- I can see from mid chest up; voice normal   SKIN: Visible skin clear. No significant rash, abnormal pigmentation or lesions.  EYES: Eyes grossly normal to inspection.    NECK: no visible masses;   RESP: No audible wheeze, cough, or  increased work of breathing.    NEURO:   Awake, alert, responds appropriately to questions.  Mentation and speech fluent.  PSYCH:affect normal,  appearance well-groomed.    DATA REVIEW    Surgical Pathology Exam: [id number]  Order: [id number]   Collected 8/18/2022  8:09 AM     Status: Final result     Visible to patient: Yes (seen)     0 Result Notes    Component  Resulting Agency   Case Report   Surgical Pathology Report                         Case: [id number]                                   Authorizing Provider:  Sarita Miles MD     Collected:           08/18/2022 08:09 AM           Ordering Location:      MAIN OR                 Received:            08/18/2022 10:38 AM           Pathologist:           Richelle Rubio MD                                                    Specimens:   A) - Other, Delphian Node and Pretracheal level 6                                                    B) - Other, Total thyroidectomy Stitch on Right side                                       Final Diagnosis   A.  LYMPH NODES, DELPHIAN NODE AND PRETRACHEAL LEVEL 6, EXCISION:  -Four benign lymph nodes.     B.  THYROID GLAND, TOTAL THYROIDECTOMY:  -Follicular adenoma with " "Hurthle cell changes, located within right lobe, 4.2 cm in greatest dimension.  -The neoplasm is encapsulated with no capsular or vascular invasion identified.  -Benign colloid nodules present in both lobes.  -No evidence of malignancy identified, see comment.   Electronically signed by Richelle Rubio MD on 8/30/2022 at  4:47 PM   Comment  [facility]   Thyroid NGS panel on follicular adenoma is in progress and results will be provided separately.   Clinical Information  [facility]   Procedure:  THYROIDECTOMY, TOTAL  Pre-op Diagnosis: Papillary thyroid carcinoma (H) [C73]  Post-op Diagnosis: C73 - Papillary thyroid carcinoma (H) [ICD-10-CM]     51 year old woman with a likely papillary thyroid cancer. She had a thyroid U/S on 6/16/2022 which showed the right lobe measured 5.4 x 3.0 x 2.1 cm, several small cysts up to 1.3 cm, TIRADS 4 nodule measuring 3.5 x 2.6 x 2.1 (report says 8.5 cm but impression indicates 3.5 cm), left lobe measured 5.6 x 1.3 x 1.0 cm with small cysts measuring up to 5 mm.  She had a thyroid biopsy on 6/23/2022 which was suspicious for papillary thyroid cancer. She has elected to proceed with total thyroidectomy.   Gross Description  DIMITRI   A(1). Other, Delphian Node and Pretracheal level 6:  The specimen is received in formalin with proper patient identification, labeled \"delphian node and pretracheal level 6\".  The specimen consists of 2 pieces of white-tan soft tissue ranging from 0.9 to 1.2 cm in greatest dimension.  5 potential lymph nodes are resected and submitted in A1.  Representative sections submitted      B(2). Other, Total thyroidectomy Stitch on Right side :  The specimen is received fresh with proper patient identification labeled \"total thyroidectomy stitch on right side\".  The specimen consists of 6.5 x 6.5 x 3.2 cm in greatest dimension thyroid tissue weighing 31.91 g.  The surface of the tissue is purple-tan soft tissue without any nodules or focal areas of hemorrhage.  " Left lobe measures 5.3 cm superior to posterior, 1.4 cm anterior to posterior, 2.4 cm transversely.  Right lobe 5.7 cm superior to posterior, 2.7 cm anterior to posterior 3.0 cm transversely.  Pyramidal lobe is absent.  Thyroid capsule is intact.  Anterior surface of the thyroid is inked in blue, posterior surface of the thyroid is inked in black and serially section.  Serially sectioning thyroid from superior to inferior reveals parenchyma as dark brown soft tissue     Left lobe has an encapsulated nodule measuring 1.1 x 0.9 x 0.7 cm measuring 2 cm from the isthmus and the left superior lobe.  The capsule buts anterior and posterior margins.     Right lobe has a well encapsulated mass pink yellow soft tissue measuring 4.2 (superior to inferior) x 2.5 (anterior to posterior) x 2.5 (transversely) cm in greatest dimension with capsule abutting anterior and posterior margins but does not invade the thyroid capsule. Additionally, there is a nodule present in the lower right thyroid measuring 0.8(superior to inferior) x 0.6 x 0.6 cm     Remaining parenchyma is beefy brown soft tissue.     Representative sections submitted  Summary of sections  B1: Nodule from the left lobe  B2: Normal parenchyma from the left lobe  B3-B12: Entire mass submitted in sections  B13: Nodule from the right lobe.  B14:Normal parenchyma from right lobe                        Microscopic Description  [facility]   Microscopic examination performed.  Immunohistochemistry studies, performed appropriate controls on block B5, demonstrate that the cells comprising the follicular adenoma are negative for HBME-1 and BRAF V600E, while focally positive for CK19.  The immunophenotype supports the interpretation.   Performing Labs  [facility]   The technical component of this testing was completed at [facility]          Thyroid Panel Focused NGS Panel: [id number]  Order: [id number]   Collected 8/30/2022   7:15 PM     Status: Final result     Visible to patient: No (scheduled for 9/22/2022  4:35 PM)     0 Result Notes    Component    Significant Results    Detected Alterations of Known or Potential Pathogenicity: None     Detected Alterations of Uncertain Significance: None     Genes with No Detected Clinically Significant Alterations: BRAF, HRAS, KRAS, NRAS, RET   Interpretation    No mutations were identified in the analyzed genes.          NGS Fusion Profile Assay: [id number]  Order: [id number]   Collected 8/30/2022  7:15 PM     Status: Final result     Visible to patient: No (scheduled for 9/19/2022  4:52 PM)     0 Result Notes    Component    Specimen Description    Tissue: Fixed slides-Collected 8/18/22, Thyroid, [id number] B5  [id number] B5   RESULTS    Fusion Event: NEGATIVE           Latest Reference Range & Units 08/26/22 14:26 08/30/22 19:15 09/15/22 07:49   Calcium 8.5 - 10.1 mg/dL   8.7   Phosphorus 2.5 - 4.5 mg/dL   3.1   Calcium Ionized Whole Blood 4.4 - 5.2 mg/dL 4.9     T4 Free 0.76 - 1.46 ng/dL   1.20   TSH 0.40 - 4.00 mU/L   1.09   NGS FUSION PROFILE ASSAY   Rpt    Parathyroid Hormone Intact 15 - 65 pg/mL 13 (L)  44   (L): Data is abnormally low  Rpt: View report in Results Review for more information

## 2022-09-26 NOTE — NURSING NOTE
Chief Complaint   Patient presents with     Video Visit     Follow up- Blood draw results        
S/P percutaneous endoscopic gastrostomy (PEG) tube placement

## 2022-09-27 ENCOUNTER — TELEPHONE (OUTPATIENT)
Dept: ENDOCRINOLOGY | Facility: CLINIC | Age: 52
End: 2022-09-27

## 2022-09-27 LAB — SCANNED LAB RESULT: NORMAL

## 2022-09-30 NOTE — TELEPHONE ENCOUNTER
JOSE C and sent [facility] 9/30/2022 for pt to c/back to schedule 1 year follow up with Dr. Valladares.

## 2022-10-11 ENCOUNTER — MYC MEDICAL ADVICE (OUTPATIENT)
Dept: PULMONOLOGY | Facility: CLINIC | Age: 52
End: 2022-10-11

## 2022-10-11 DIAGNOSIS — R91.8 PULMONARY NODULES: Primary | ICD-10-CM

## 2022-10-11 NOTE — TELEPHONE ENCOUNTER
Chest CT to be done 6 months after previous CT per Dr. Falcon.  Ct ordered and scheduling notified.

## 2022-10-15 ENCOUNTER — HEALTH MAINTENANCE LETTER (OUTPATIENT)
Age: 52
End: 2022-10-15

## 2023-01-17 ENCOUNTER — ANCILLARY PROCEDURE (OUTPATIENT)
Dept: CT IMAGING | Facility: CLINIC | Age: 53
End: 2023-01-17
Attending: INTERNAL MEDICINE
Payer: COMMERCIAL

## 2023-01-17 DIAGNOSIS — R91.8 PULMONARY NODULES: ICD-10-CM

## 2023-01-17 LAB — RADIOLOGIST FLAGS: NORMAL

## 2023-01-17 PROCEDURE — 71250 CT THORAX DX C-: CPT | Mod: GC | Performed by: RADIOLOGY

## 2023-01-23 ENCOUNTER — VIRTUAL VISIT (OUTPATIENT)
Dept: PULMONOLOGY | Facility: CLINIC | Age: 53
End: 2023-01-23
Attending: INTERNAL MEDICINE
Payer: COMMERCIAL

## 2023-01-23 DIAGNOSIS — D34 FOLLICULAR ADENOMA OF THYROID GLAND: ICD-10-CM

## 2023-01-23 DIAGNOSIS — R91.8 PULMONARY NODULES: Primary | ICD-10-CM

## 2023-01-23 PROCEDURE — 99213 OFFICE O/P EST LOW 20 MIN: CPT | Mod: GT | Performed by: INTERNAL MEDICINE

## 2023-01-23 PROCEDURE — G0463 HOSPITAL OUTPT CLINIC VISIT: HCPCS | Mod: PN,GT | Performed by: INTERNAL MEDICINE

## 2023-01-23 NOTE — LETTER
1/23/2023       RE: Miriam Beverly  [address]  [place] [address]     Dear Colleague,    Thank you for referring your patient, Miriam Beverly, to the [facility] at [facility]. Please see a copy of my visit note below.    Miriam is a 52 year old who is being evaluated via a billable video visit.      How would you like to obtain your AVS? MyChart  If the video visit is dropped, the invitation should be resent by: Text to cell phone: [phone]  Will anyone else be joining your video visit? No        Video-Visit Details    Type of service:  Video Visit     Originating Location (pt. Location): Home    Distant Location (provider location):  On-site  Platform used for Video Visit: [employer]    Call start 135   Call end 145 pm    Miriam is a 51 year old who is being evaluated via a billable video visit.      How would you like to obtain your AVS? MyChart  If the video visit is dropped, the invitation should be resent by: Send to e-mail at: [email or web address]  Will anyone else be joining your video visit? No     Mariann CANTU            LUNG NODULE & INTERVENTIONAL PULMONARY CLINIC  CLINICS & SURGERY CENTER  [facility]      August 9, 2022            Patient: Miriam Beverly        Assessment:   Miriam Beverly is a 52 year old seen in clinic for right lower lobe lung nodules. These are consistent with past infection.  These changes are unlikely to be related to her adenoma which is benign.    Plan:    CT 1 year.                   Chief Complaint: Lung nodule    History of Present Illness:   She is doing well since her surgery.    No breathing issues.         Data:   All pertinent laboratory and imaging data reviewed.           Past Medical History:     Past Medical History:   Diagnosis Date     Allergy-induced asthma      Arachnoid cyst of posterior cranial fossa 10/05/2021    3 cm,superior  cerebellar cistern; presumptive imaging diagnosis     Gestational hypertension      HTN (hypertension)      Hyperlipidemia      Meningioma (H) 10/05/2021    presumptive imaging diagnsois; 14 mm , left temporal convexity     Postpartum cardiomyopathy      Preeclampsia      Pulmonary nodules 2022     PVC's (premature ventricular contractions)      Sinusitis 2017            Past Surgical History:     Past Surgical History:   Procedure Laterality Date      SECTION       GYN SURGERY       MAMMOPLASTY AUGMENTATION  2012     THYROIDECTOMY N/A 2022    Procedure: THYROIDECTOMY, TOTAL;  Surgeon: Sarita Miles MD;  Location: [facility] OR     wisdom teeth extraction              Social History:     Social History     Socioeconomic History     Marital status:      Spouse name: Not on file     Number of children: Not on file     Years of education: Not on file     Highest education level: Not on file   Occupational History     Not on file   Tobacco Use     Smoking status: Never     Smokeless tobacco: Never   Substance and Sexual Activity     Alcohol use: Yes     Comment: occ     Drug use: Never     Sexual activity: Not on file   Other Topics Concern     Not on file   Social History Narrative     Not on file     Social Determinants of Health     Financial Resource Strain: Not on file   Food Insecurity: Not on file   Transportation Needs: Not on file   Physical Activity: Not on file   Stress: Not on file   Social Connections: Not on file   Intimate Partner Violence: Not on file   Housing Stability: Not on file              Family History:     Family History   Problem Relation Age of Onset     Hypertension Mother      Hypertension Father      Breast Cancer Paternal Grandmother      Diabetes Paternal Aunt      Diabetes Paternal Aunt      Thyroid Cancer No family hx of      Thyroid Disease No family hx of             Allergies:   No Known Allergies         Medications:     Current Outpatient  Medications   Medication     levothyroxine (SYNTHROID/LEVOTHROID) 125 MCG tablet     lisinopril (ZESTRIL) 10 MG tablet     loratadine (CLARITIN) 10 MG tablet     metoprolol succinate ER (TOPROL XL) 50 MG 24 hr tablet     Multiple Vitamin (ONE-A-DAY ESSENTIAL) TABS     No current facility-administered medications for this visit.            Review of Systems:   A 12 point review of systems is negative aside for as noted above         Physical Exam:   Virtual video visit. Unable to obtain full exam  Gen:Appears well. Able to talk in full sentences.   HEENT: conjunctiva white  Neuro: Alert, able to answer questions             Again, thank you for allowing me to participate in the care of your patient.      Sincerely,    Kenneth Falcon MD

## 2023-01-23 NOTE — PROGRESS NOTES
Miriam is a 52 year old who is being evaluated via a billable video visit.      How would you like to obtain your AVS? MyChart  If the video visit is dropped, the invitation should be resent by: Text to cell phone: [phone]  Will anyone else be joining your video visit? No        Video-Visit Details    Type of service:  Video Visit     Originating Location (pt. Location): Home    Distant Location (provider location):  On-site  Platform used for Video Visit: [employer]    Call start 135   Call end 145 pm    Miriam is a 51 year old who is being evaluated via a billable video visit.      How would you like to obtain your AVS? MyChart  If the video visit is dropped, the invitation should be resent by: Send to e-mail at: [email or web address]  Will anyone else be joining your video visit? No     Mariann CANTU            LUNG NODULE & INTERVENTIONAL PULMONARY CLINIC  CLINICS & SURGERY CENTER  [facility]                 Patient: Miriam Beverly        Assessment:   Miriam Beverly is a 52 year old seen in clinic for right lower lobe lung nodules. These are consistent with past infection.  These changes are unlikely to be related to her adenoma which is benign.    Plan:    CT 1 year.                   Chief Complaint: Lung nodule    History of Present Illness:   She is doing well since her surgery.    No breathing issues.         Data:   All pertinent laboratory and imaging data reviewed.           Past Medical History:     Past Medical History:   Diagnosis Date     Allergy-induced asthma      Arachnoid cyst of posterior cranial fossa 10/05/2021    3 cm,superior cerebellar cistern; presumptive imaging diagnosis     Gestational hypertension      HTN (hypertension)      Hyperlipidemia      Meningioma (H) 10/05/2021    presumptive imaging diagnsois; 14 mm , left temporal convexity     Postpartum cardiomyopathy 2002     Preeclampsia      Pulmonary nodules 06/2022     PVC's (premature  ventricular contractions)      Sinusitis 2017            Past Surgical History:     Past Surgical History:   Procedure Laterality Date      SECTION       GYN SURGERY       MAMMOPLASTY AUGMENTATION  2012     THYROIDECTOMY N/A 2022    Procedure: THYROIDECTOMY, TOTAL;  Surgeon: Sarita Miles MD;  Location: [facility] OR     wisdom teeth extraction              Social History:     Social History     Socioeconomic History     Marital status:      Spouse name: Not on file     Number of children: Not on file     Years of education: Not on file     Highest education level: Not on file   Occupational History     Not on file   Tobacco Use     Smoking status: Never     Smokeless tobacco: Never   Substance and Sexual Activity     Alcohol use: Yes     Comment: occ     Drug use: Never     Sexual activity: Not on file   Other Topics Concern     Not on file   Social History Narrative     Not on file     Social Determinants of Health     Financial Resource Strain: Not on file   Food Insecurity: Not on file   Transportation Needs: Not on file   Physical Activity: Not on file   Stress: Not on file   Social Connections: Not on file   Intimate Partner Violence: Not on file   Housing Stability: Not on file              Family History:     Family History   Problem Relation Age of Onset     Hypertension Mother      Hypertension Father      Breast Cancer Paternal Grandmother      Diabetes Paternal Aunt      Diabetes Paternal Aunt      Thyroid Cancer No family hx of      Thyroid Disease No family hx of             Allergies:   No Known Allergies         Medications:     Current Outpatient Medications   Medication     levothyroxine (SYNTHROID/LEVOTHROID) 125 MCG tablet     lisinopril (ZESTRIL) 10 MG tablet     loratadine (CLARITIN) 10 MG tablet     metoprolol succinate ER (TOPROL XL) 50 MG 24 hr tablet     Multiple Vitamin (ONE-A-DAY ESSENTIAL) TABS     No current facility-administered medications for this  visit.            Review of Systems:   A 12 point review of systems is negative aside for as noted above         Physical Exam:   Virtual video visit. Unable to obtain full exam  Gen:Appears well. Able to talk in full sentences.   HEENT: conjunctiva white  Neuro: Alert, able to answer questions

## 2023-02-09 ENCOUNTER — ANCILLARY PROCEDURE (OUTPATIENT)
Dept: MRI IMAGING | Facility: CLINIC | Age: 53
End: 2023-02-09
Attending: PHYSICIAN ASSISTANT
Payer: COMMERCIAL

## 2023-02-09 DIAGNOSIS — D32.9 MENINGIOMA (H): ICD-10-CM

## 2023-02-09 PROCEDURE — 70553 MRI BRAIN STEM W/O & W/DYE: CPT | Performed by: RADIOLOGY

## 2023-02-09 PROCEDURE — A9585 GADOBUTROL INJECTION: HCPCS | Performed by: RADIOLOGY

## 2023-02-09 RX ORDER — GADOBUTROL 604.72 MG/ML
10 INJECTION INTRAVENOUS ONCE
Status: COMPLETED | OUTPATIENT
Start: 2023-02-09 | End: 2023-02-09

## 2023-02-09 RX ADMIN — GADOBUTROL 8 ML: 604.72 INJECTION INTRAVENOUS at 11:19

## 2023-02-10 ENCOUNTER — OFFICE VISIT (OUTPATIENT)
Dept: NEUROSURGERY | Facility: CLINIC | Age: 53
End: 2023-02-10
Payer: COMMERCIAL

## 2023-02-10 VITALS — HEART RATE: 74 BPM | SYSTOLIC BLOOD PRESSURE: 132 MMHG | OXYGEN SATURATION: 99 % | DIASTOLIC BLOOD PRESSURE: 90 MMHG

## 2023-02-10 DIAGNOSIS — D32.9 MENINGIOMA (H): Primary | ICD-10-CM

## 2023-02-10 DIAGNOSIS — R47.89 WORD FINDING DIFFICULTY: ICD-10-CM

## 2023-02-10 PROCEDURE — 99214 OFFICE O/P EST MOD 30 MIN: CPT | Performed by: PHYSICIAN ASSISTANT

## 2023-02-10 NOTE — PROGRESS NOTES
[facility]  Department of Neurosurgery  Center for Skull Base and Pituitary Surgery    Name: Miriam Beverly  MRN: [id number]  Age: 52 year old  : 1970  02/10/2023      Chief Complaint:   Left temporal meningioma, 6 month follow up    History of Present Illness:   Miriam Beverly is a 52 year old female with a history of gestational hypertension with secondary cardiomyopathy and thyroid nodule with recent total thyroidectomy who is here today for 6 month follow up of left temporal meningioma. This was discovered in 2021. Today she reports continued intermittent symptoms for the past 18 months of tingling sensation into bilateral legs, issues with fine motor skills of bilateral hands with such things as holding a piece of paper or opening packages, and difficulty with word finding. She states she occasionally has trouble finishing sentences, which has been a noticeable change from her normal. Additionally she reports occasional lightheadedness that she cannot attribute to dehydration or change in position, as well as blurry vision.       Review of Systems:   Pertinent items are noted in HPI or as in patient entered ROS below, remainder of complete ROS is negative.      Physical Exam:   BP (!) 132/90   Pulse 74   SpO2 99%    General: No acute distress.    MSK: Moves all extremities.  No obvious deformity.  Neuro: The patient is fully oriented. Speech is normal. Extraocular movements are intact without nystagmus. Facial sensation is intact in V1, V2, V3 distributions. Facial nerve function is normal, rated as a House Brackmann 1.  Shoulders are full strength. There is no pronator drift. Full strength in all extremities. Sensation intact throughout. No dysmetria with finger-nose-finger testing.   Psych: Normal mood and affect. Behavior is normal.        Imaging:  MRI brain obtained 2023 revealed an enhancing dural based mass along the left temporal lobe within the left middle  cranial fossa measuring 1.8 x 1.3 x 1.6 cm without significant change in size from previous imaging on 8/10/2022.      Assessment:  Left temporal meningioma, 6 month follow up    Plan:  At today's visit we went over imaging and discussed the stable nature of left temporal Meningioma. We discussed the unlikelihood that her symptoms could be attributed to her meningioma given its anatomical location. Neurology referral was placed for further assessment of these symptoms. We will have Ms. Beverly follow up in 1 year with surveillance brain MRI. She was encouraged to reach out with any new questions or concerns.       Alyssa Ramsey PA-C  Neurosurgery Department

## 2023-02-10 NOTE — PATIENT INSTRUCTIONS
Follow up in 1 year with MRI prior to appointment.  Please reach out sooner with new questions/concerns.

## 2023-08-06 NOTE — PROGRESS NOTES
Dear Dr. Mcqueen:    I had the pleasure of meeting Miriam Beverly in consultation today at the [facility] at your request.     History of Present Illness:   Miriam Beverly is a 51 year old woman referred for evaluation of neck mass. The patient saw her Ob/gyn in 2022 and noted a mass of the neck to the right of midline. The mass had been present for about 2 months prior to presentation.    She had a thyroid U/S on 2022 which showed the right lobe measured 5.4 x 3.0 x 2.1 cm, several small cysts up to 1.3 cm, TIRADS 4 nodule measuring 3.5 x 2.6 x 2.1 (report says 8.5 cm but impression indicates 3.5 cm), left lobe measured 5.6 x 1.3 x 1.0 cm with small cysts measuring up to 5 mm.     She had a thyroid biopsy on 2022 which was suspicious for papillary thyroid cancer.    She had a CT scan by cardiology which showed pulmonary nodules. She is seeing primary care for this on Friday.     TSH normal in 2022.    She has seen a surgeon through [facility].  She says that she had a dedicated neck ultrasound performed so it sounds like it was done of the anterior neck and perhaps not the lateral neck.    She is not a fermin. She has no issues with swallowing. She thinks her voice is more raspy. She has no breathing issues.       Past medical history: anxiety, asthma, hypertension, HPV    Past surgical history: breast augmentation, , colposcopy, wisdom tooth extraction (no bleeding or anesthesia issues)    Social history: Works for non-profit. Occasional alcohol use. . Nonsmoker. Live with partner.    Family history: No thyroid cancer history. Grandmother with breast cancer.    MEDICATIONS:     Current Outpatient Medications   Medication Sig Dispense Refill     lisinopril (ZESTRIL) 10 MG tablet TAKE 1 TABLET(10 MG) BY MOUTH EVERY DAY       metoprolol succinate ER (TOPROL XL) 50 MG 24 hr tablet TAKE 1 TABLET(50 MG) BY MOUTH EVERY DAY         ALLERGIES:  No  "Known Allergies    HABITS/SOCIAL HISTORY:   Works for non-profit.   Occasional alcohol use.   .   Nonsmoker.   Live with partner.    Social History     Socioeconomic History     Marital status:      Spouse name: Not on file     Number of children: Not on file     Years of education: Not on file     Highest education level: Not on file   Occupational History     Not on file   Tobacco Use     Smoking status: Not on file     Smokeless tobacco: Not on file   Substance and Sexual Activity     Alcohol use: Not on file     Drug use: Not on file     Sexual activity: Not on file   Other Topics Concern     Not on file   Social History Narrative     Not on file     Social Determinants of Health     Financial Resource Strain: Not on file   Food Insecurity: Not on file   Transportation Needs: Not on file   Physical Activity: Not on file   Stress: Not on file   Social Connections: Not on file   Intimate Partner Violence: Not on file   Housing Stability: Not on file       PAST MEDICAL HISTORY: No past medical history on file.     PAST SURGICAL HISTORY: No past surgical history on file.    FAMILY HISTORY:  No family history on file.    REVIEW OF SYSTEMS:  12 point ROS was negative other than the symptoms noted above in the HPI.  Patient Supplied Answers to Review of Systems   ENT ROS 7/14/2022   Constitutional: Weight loss, Appetite change, Unexplained fatigue   Neurology: Dizzy spells, Headache   Psychology: Frequently feeling anxious   Ears, Nose, Throat: Hoarseness   Cardiopulmonary: Cough   Musculoskeletal: Neck pain   Allergy/Immunology: Allergies or hay fever         PHYSICAL EXAMINATION:   /73   Pulse 76   Ht 1.676 m (5' 6\")   Wt 77.1 kg (170 lb)   BMI 27.44 kg/m     Appearance:   normal; NAD, age-appropriate appearance, well-developed, normal habitus   Communication:   normal; communicates verbally, normal voice quality   Head/Face:   inspection -  Normal; no scars or visible lesions   Salivary " glands -  Normal size, no tenderness, swelling, or palpable masses   Facial strength -  Normal and symmetric   Skin:  normal, no rash   Ears:  auricle (AD) -  normal  EAC (AD) -  normal  TM (AD) -  Normal, no effusion  auricle (AS) -  normal  EAC (AS) -  normal  TM (AS) -  Normal, no effusion  Normal clinical speech reception   Nose:  Ext. inspection -  Normal  Internal Inspection -  Normal mucosa, septum, and turbinates   Nasopharynx:  normal mucosa, no masses   Oral Cavity:  lips -  Normal mucosa, oral competence, and stoma size   Age-appropriate dentition, healthy gingival mucosa   Hard palate, buccal, floor of mouth muc   Oropharynx:  mucosa -  Normal, no lesions  soft palate -  Normal, no lesions, no asymmetry, normal elevation  tonsils -  Normal, no exudates, no abnormal lesions, symmetric  Base of tongue - normal  Vallecula - clear   Hypopharynx:  Normal pyriform sinus and pharyngeal wall mucosa   No pooled secretions    Larynx:  Epiglottis, AE folds, false vocal cords, true vocal cords, arytenoids normal in appearance, bilaterally mobile cords    Neck: No visible mass or asymmetry   thyroid -  Palpable right thyroid nodule about 3-4 cm in size  Normal range of motion   Lymphatic:  no abnormal nodes   Cardiovascular:  warm, pink, well-perfused extremities without swelling, tenderness, or edema   Respiratory:  Normal respiratory effort, no stridor   Neuro/Psych.:  mood/affect -  normal  mental status -  normal        PROCEDURES:   Flexible fiberoptic laryngoscopy: Scope exam was indicated due to papillary thyroid cancer. Verbal consent was obtained. The nasal cavity was prepped with an aerosolized solution of topical anesthetic and vasoconstrictive agent. The scope was passed through the anterior nasal cavity and advanced. Inspection of the nasopharynx revealed no gross abnormality. The base of tongue and vallecula are normal. The epiglottis, AE folds, false cords, true cords, arytenoids are normal. Inspection  of the larynx revealed bilaterally mobile vocal cords. Pyriform sinuses are symmetric. The airway is patent. Procedure tolerated well with no immediate complications noted.    RESULTS REVIEWED:   Reviewed multiple notes from ob/gyn, TSH, U/s report, path report      IMPRESSION AND PLAN:   Miriam Beverly is a 51 year old woman referred for evaluation of a likely papillary thyroid cancer of the right thyroid lobe. The nodule measured 3.5 cm. Given the size she would actually qualify for a hemithyroidectomy versus total thyroidectomy.  She had an ultrasound performed locally and at least on the report had no lymphadenopathy.  However when I discussed it with her it sounds like it did not include the entire lateral neck.  I discussed my preference to do levels I-VII bilaterally when doing the neck ultrasound since that can significantly change our recommendations if there is nikia disease.    We discussed the surgery in detail.   We discussed hemithyroidectomy versus total thyroidectomy.  We discussed the monitoring difficulties that can be created with a hemithyroidectomy and certainly the inability to get radioactive iodine versus need for second surgery if this is required.    I discussed the planned incision.  We discussed the risks of cosmetic changes and scarring.  We discussed the risk of infection.  We discussed the risk of bleeding which could happen at the time of surgery or in delayed fashion.  We discussed bleeding after surgery can result in need for return trip to the operating room and airway risk.  I had a discussion about the risks of surgery.  We discussed temporary postoperative dysphagia related to manipulation of the strap muscles.  We discussed the risk to the superior laryngeal nerve and impacts on vocal pitch.  We discussed the risks to the recurrent laryngeal nerve with impacts depending on hemithyroidectomy versus total thyroidectomy.  We discussed with that injury to one side can result in  dysphonia and dysphagia, whereas injuries in both sides can result in an airway emergency and need for tracheostomy.  We discussed intraoperative nerve monitoring and if the nerve does not stimulate on one side after performing the right side first, we would not proceed with the left side.  We discussed the risks to the parathyroid glands with the difference between the hemithyroidectomy and total thyroidectomy being potential permanent hypocalcemia with the total thyroidectomy.  We discussed the need for postoperative calcium replacement.  I discussed the role of GLORIA drain placement postoperatively.  We discussed potential need for thyroid replacement depending on the surgery that she proceeds with.    She is interested in meeting with endocrinology.  A consult was placed today.  We will repeat her neck ultrasound here.    She has already met with a surgeon at [facility].  We are happy to be involved in her care however she wishes us to be, our contact information was provided if she wishes to proceed with surgery here.    Thank you very much for the opportunity to participate in the care of your patient.      Sarita Miles MD  Otolaryngology- Head & Neck Surgery      Addendum: I reached out to our neurosurgery team with regards to the meningioma seen on her MRI of the brain.  They did recommend annual follow-up for this.  They will work on getting this arranged.  A [employer] message was sent to the patient to make her aware of the update.      This note was dictated with voice recognition software and then edited. Please excuse any unintentional errors.         CC:  Kiki Mcqueen MD  Ob Gyn and Infertility  [address]  [place] [id number]   Patient

## 2023-08-20 ENCOUNTER — HEALTH MAINTENANCE LETTER (OUTPATIENT)
Age: 53
End: 2023-08-20

## 2023-08-28 ENCOUNTER — LAB (OUTPATIENT)
Dept: LAB | Facility: CLINIC | Age: 53
End: 2023-08-28
Payer: COMMERCIAL

## 2023-08-28 DIAGNOSIS — E89.0 POSTOPERATIVE HYPOTHYROIDISM: ICD-10-CM

## 2023-08-28 DIAGNOSIS — R89.6 ABNORMAL CYTOLOGY: ICD-10-CM

## 2023-08-28 LAB
T4 FREE SERPL-MCNC: 2.13 NG/DL (ref 0.9–1.7)
TSH SERPL DL<=0.005 MIU/L-ACNC: 0.05 UIU/ML (ref 0.3–4.2)

## 2023-08-28 PROCEDURE — 84439 ASSAY OF FREE THYROXINE: CPT

## 2023-08-28 PROCEDURE — 84443 ASSAY THYROID STIM HORMONE: CPT

## 2023-08-28 PROCEDURE — 36415 COLL VENOUS BLD VENIPUNCTURE: CPT

## 2023-09-26 ENCOUNTER — VIRTUAL VISIT (OUTPATIENT)
Dept: ENDOCRINOLOGY | Facility: CLINIC | Age: 53
End: 2023-09-26
Payer: COMMERCIAL

## 2023-09-26 VITALS — BODY MASS INDEX: 27.92 KG/M2 | WEIGHT: 173 LBS

## 2023-09-26 DIAGNOSIS — E89.0 POSTOPERATIVE HYPOTHYROIDISM: Primary | ICD-10-CM

## 2023-09-26 PROCEDURE — 99214 OFFICE O/P EST MOD 30 MIN: CPT | Mod: VID

## 2023-09-26 RX ORDER — LEVOTHYROXINE SODIUM 137 UG/1
137 TABLET ORAL DAILY
Qty: 90 TABLET | Refills: 4 | Status: SHIPPED | OUTPATIENT
Start: 2023-09-26 | End: 2024-02-27 | Stop reason: DRUGHIGH

## 2023-09-26 RX ORDER — ALBUTEROL SULFATE 90 UG/1
2 AEROSOL, METERED RESPIRATORY (INHALATION)
COMMUNITY
Start: 2023-09-18

## 2023-09-26 NOTE — LETTER
9/26/2023       RE: Miriam Beverly  [address]  [place] [place] [facility] [address]     Dear Colleague,    Thank you for referring your patient, Miriam Beverly, to the [facility] [place] at [facility]. Please see a copy of my visit note below.    Endocrine video visit      Attending Assessment/Plan :     1.  Post surgical hypothyroidism. On LT4 125 mcg/day (1.65 mcg/kg/day)  .  Treat to normal target TSH   Plan as per # 2   If stable we will send her back to primary care for longer term followup after that.     Low TSh and high free T4 on LT4 125 mcg/day .  She has some symptoms of thyrotoxicosis  Reduce LT4 to 112 mcg/day and repeat labs in 3 months     S/p removal of follicular adenoma. No fusions or mutation on surgical specimen.  No further work up is needed.     Perimenopause by age     Due to the continued risks of COVID 19 transmission and to improve ease of access this visit was a video visit. The patient gave verbal consent for the visit today.    I have independently reviewed and interpreted labs, imaging as indicated.   Distant Location (provider location):  Off-site  Mode of Communication:  Video Conference via [employer]  Chart review/prep time 1  [id number]  Visit Start time 1459  Visit Stop time  1508   _24_ minutes spent on the date of the encounter doing chart review, history and exam, documentation and further activities as noted above.    Cheyenne Valladares MD    Chief complaint: HISTORY OF PRESENT ILLNESS  Miriam presents for follow up of of post surgical hypothyroidism s/p removal of follicular adenoma.    I last saw her a year ago.  At that time she was on LT4 125 mcg/day with norrmal TFTS .  She already had labs in anticipation of this appt, abnormal.      Right thyroid enlargement was felt on examination 9/29/2021.   FNAB of the 3.5 cm right dominant nodule was read as suspicious for papillary thyroid carcinoma.  "  Treatment has been as follows:   8/18/2022 total thyroidectomy (Dr Miles) . 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      Endocrine relevant labs are as follows:  5/16/13 TSH 1.24  9/29/2021 TSh 1.45  8/18/2022 PTH 11, iCa 4.6  8/26/2022 PTH 13, iCa 4.9  9/15/2022 TSH 1.09, free T4 1.2, Ca 8.7, phos 3.1,PTH 44  ; after appt result Tg 0.38, CODY < 0.4   8/28/23 TSH 0.05, free t4 2.13    Relevant imaging is as follows: (as read by me as it pertains to endocrine relevant organs)  6/16/22 thyroid US ([facility]):  Right thyroid nodule 2.6 x 2.1 x  3.5 cm hypoechoic heterogeneous -6/23/2022 FNAB  Right medial inferior  0.7 x 0.6x 1.2 cystic with small mural solid  Right inf medial  0.8 x 0.5 x 0.9 mixed   Left inferior mid posterior  0.3 x  0.2 x 0.4   Left posterior mid 0.3 x 0.3 x 0.5 hypoechoic  7/13/2022 neck US [facility]   7/18/2022 neck US  Right level 5 inferior # 1 0.7 x   Left level 3 # 1 0.3 x 0.2 x   Left level 3 # 2 0.4 x 0.6 x 1.1   7/27/2022 CT chest ([facility]) multiple clustered nodular and tubular opacities at the extreme anterior/inferior right lung base; similar to 6/20 study.      REVIEW OF SYSTEMS  Sick more in the last year than ever in my life  Sick x 2.5 weeks  Energy is \"ehhh\"  Appetite is minimal -  needs to remind self to eat - no weight change .  Sleep is really well since the surgery (used to be terrible sleeper)  Cardiac: negative; occasionally has heart racing  Respiratory: coughing x weeks  GI: BM x 1-2 , usually once;   Covid x 1 12/2022   Just finished round of prednisone  No tremor   Heat intolerance   Hair loss has increased ; nails are more brittle - different from before     Past Medical History  Past Medical History:   Diagnosis Date    Allergy-induced asthma     Arachnoid cyst of posterior cranial fossa 10/05/2021    3 cm,superior cerebellar " cistern; presumptive imaging diagnosis    Gestational hypertension     HTN (hypertension)     Hyperlipidemia     Meningioma (H) 10/05/2021    presumptive imaging diagnsois; 14 mm , left temporal convexity    Postpartum cardiomyopathy     Preeclampsia     Pulmonary nodules 2022    PVC's (premature ventricular contractions)     Sinusitis 2017     Past Surgical History:   Procedure Laterality Date     SECTION      GYN SURGERY      MAMMOPLASTY AUGMENTATION  2012    THYROIDECTOMY N/A 2022    Procedure: THYROIDECTOMY, TOTAL;  Surgeon: Sarita Miles MD;  Location: [facility] OR    wisdom teeth extraction       Medications    Current Outpatient Medications   Medication Sig Dispense Refill    levothyroxine (SYNTHROID/LEVOTHROID) 125 MCG tablet Take 1 tablet (125 mcg) by mouth daily 90 tablet 4    lisinopril (ZESTRIL) 10 MG tablet TAKE 1 TABLET(10 MG) BY MOUTH EVERY DAY      loratadine (CLARITIN) 10 MG tablet Take 10 mg by mouth daily      metoprolol succinate ER (TOPROL XL) 50 MG 24 hr tablet TAKE 1 TABLET(50 MG) BY MOUTH EVERY DAY      Multiple Vitamin (ONE-A-DAY ESSENTIAL) TABS Take 1 tablet by mouth       Allergies  Allergies   Allergen Reactions    Other Environmental Allergy        Family History  Family History   Problem Relation Age of Onset    Hypertension Mother     Hypertension Father     Breast Cancer Paternal Grandmother     Diabetes Paternal Aunt     Diabetes Paternal Aunt     Thyroid Cancer No family hx of     Thyroid Disease No family hx of      Social History  Social History     Tobacco Use    Smoking status: Never    Smokeless tobacco: Never   Substance Use Topics    Alcohol use: Yes     Comment: occ    Drug use: Never     Indoor cycling or light weights multiple times/week     Physical Exam  There were no vitals taken for this visit.  There is no height or weight on file to calculate BMI.   BP Readings from Last 1 Encounters:   02/10/23 (!) 132/90      Pulse Readings from Last  "1 Encounters:   02/10/23 74      Resp Readings from Last 1 Encounters:   08/31/22 16      Temp Readings from Last 1 Encounters:   08/18/22 98.8  F (37.1  C) (Oral)      SpO2 Readings from Last 1 Encounters:   02/10/23 99%      Wt Readings from Last 1 Encounters:   08/31/22 77.9 kg (171 lb 11.2 oz)      Ht Readings from Last 1 Encounters:   08/26/22 1.676 m (5' 6\")     GENERAL: no distress-- I can see from mid chest up; voice normal   SKIN: Visible skin clear. No significant rash, abnormal pigmentation or lesions.  EYES: glasses; Eyes grossly normal to inspection.    NECK: no visible masses;   RESP: No audible wheeze, cough, or  increased work of breathing.    NEURO:   Awake, alert, responds appropriately to questions.  Mentation and speech fluent.  PSYCH:affect normal,  appearance well-groomed.    DATA REVIEW     Latest Ref Rn 9/15/2022  7:49 AM 8/28/2023  1:00 PM   ENDO THYROID LABS-[facility]      TSH 0.40 - 4.00 mU/L 1.09     TSH 0.30 - 4.20 uIU/mL  0.05 (L)    FREE T4 0.90 - 1.70 ng/dL 1.20  2.13 (H)       Legend:  (L) Low  (H) High  on    "

## 2023-09-26 NOTE — PROGRESS NOTES
Endocrine video visit      Attending Assessment/Plan :     1.  Post surgical hypothyroidism. On LT4 125 mcg/day (1.65 mcg/kg/day)  .  Treat to normal target TSH   Plan as per # 2   If stable we will send her back to primary care for longer term followup after that.     Low TSh and high free T4 on LT4 125 mcg/day (1.61 mcg/kg/day) .  She has some symptoms of thyrotoxicosis  Reduce LT4 to 112 mcg/day (1.44 mcg/kg/day) and repeat labs in 3 months     Addendum  11/29/2023 tsh 0.17, free T4 1.79  2/26/24 TSh 0.32, free T4 1.69- on LT4 108 mcg/day ; changed to 100 x 7.5/week (107 mcg/day)  4/1/24 TSH 0.17, free T4 1.76   5/22/24 TSH 0.12, free T4 1.66   8/8/24 TSH 0.89, free t4 1.58    S/p removal of follicular adenoma. No fusions or mutation on surgical specimen.  No further work up is needed.     Perimenopause by age     Due to the continued risks of COVID 19 transmission and to improve ease of access this visit was a video visit. The patient gave verbal consent for the visit today.    I have independently reviewed and interpreted labs, imaging as indicated.   Distant Location (provider location):  Off-site  Mode of Communication:  Video Conference via [employer]  Chart review/prep time 1  [id number]  Visit Start time 1459  Visit Stop time  1508   _24_ minutes spent on the date of the encounter doing chart review, history and exam, documentation and further activities as noted above.    Cheyenne Valladares MD    Chief complaint: HISTORY OF PRESENT ILLNESS  Miriam presents for follow up of of post surgical hypothyroidism s/p removal of follicular adenoma.    I last saw her a year ago.  At that time she was on LT4 125 mcg/day with norrmal TFTS .  She already had labs in anticipation of this appt, abnormal.      Right thyroid enlargement was felt on examination 9/29/2021.   FNAB of the 3.5 cm right dominant nodule was read as suspicious for papillary thyroid carcinoma.   Treatment has been as follows:   8/18/2022 total  "thyroidectomy (Dr Miles) . 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      Endocrine relevant labs are as follows:  5/16/13 TSH 1.24  9/29/2021 TSh 1.45  8/18/2022 PTH 11, iCa 4.6  8/26/2022 PTH 13, iCa 4.9  9/15/2022 TSH 1.09, free T4 1.2, Ca 8.7, phos 3.1,PTH 44  ; after appt result Tg 0.38, CODY < 0.4   8/28/23 TSH 0.05, free t4 2.13    Relevant imaging is as follows: (as read by me as it pertains to endocrine relevant organs)  6/16/22 thyroid US ([facility]):  Right thyroid nodule 2.6 x 2.1 x  3.5 cm hypoechoic heterogeneous -6/23/2022 FNAB  Right medial inferior  0.7 x 0.6x 1.2 cystic with small mural solid  Right inf medial  0.8 x 0.5 x 0.9 mixed   Left inferior mid posterior  0.3 x  0.2 x 0.4   Left posterior mid 0.3 x 0.3 x 0.5 hypoechoic  7/13/2022 neck US [facility]   7/18/2022 neck US  Right level 5 inferior # 1 0.7 x   Left level 3 # 1 0.3 x 0.2 x   Left level 3 # 2 0.4 x 0.6 x 1.1   7/27/2022 CT chest ([facility]) multiple clustered nodular and tubular opacities at the extreme anterior/inferior right lung base; similar to 6/20 study.      REVIEW OF SYSTEMS  Sick more in the last year than ever in my life  Sick x 2.5 weeks  Energy is \"ehhh\"  Appetite is minimal -  needs to remind self to eat - no weight change .  Sleep is really well since the surgery (used to be terrible sleeper)  Cardiac: negative; occasionally has heart racing  Respiratory: coughing x weeks  GI: BM x 1-2 , usually once;   Covid x 1 12/2022   Just finished round of prednisone  No tremor   Heat intolerance   Hair loss has increased ; nails are more brittle - different from before     Past Medical History  Past Medical History:   Diagnosis Date    Allergy-induced asthma     Arachnoid cyst of posterior cranial fossa 10/05/2021    3 cm,superior cerebellar cistern; presumptive imaging diagnosis    Gestational " hypertension     HTN (hypertension)     Hyperlipidemia     Meningioma (H) 10/05/2021    presumptive imaging diagnsois; 14 mm , left temporal convexity    Postpartum cardiomyopathy     Preeclampsia     Pulmonary nodules 2022    PVC's (premature ventricular contractions)     Sinusitis 2017     Past Surgical History:   Procedure Laterality Date     SECTION      GYN SURGERY      MAMMOPLASTY AUGMENTATION  2012    THYROIDECTOMY N/A 2022    Procedure: THYROIDECTOMY, TOTAL;  Surgeon: Sarita Miles MD;  Location: [facility] OR    wisdom teeth extraction       Medications    Current Outpatient Medications   Medication Sig Dispense Refill    levothyroxine (SYNTHROID/LEVOTHROID) 125 MCG tablet Take 1 tablet (125 mcg) by mouth daily 90 tablet 4    lisinopril (ZESTRIL) 10 MG tablet TAKE 1 TABLET(10 MG) BY MOUTH EVERY DAY      loratadine (CLARITIN) 10 MG tablet Take 10 mg by mouth daily      metoprolol succinate ER (TOPROL XL) 50 MG 24 hr tablet TAKE 1 TABLET(50 MG) BY MOUTH EVERY DAY      Multiple Vitamin (ONE-A-DAY ESSENTIAL) TABS Take 1 tablet by mouth       Allergies  Allergies   Allergen Reactions    Other Environmental Allergy        Family History  Family History   Problem Relation Age of Onset    Hypertension Mother     Hypertension Father     Breast Cancer Paternal Grandmother     Diabetes Paternal Aunt     Diabetes Paternal Aunt     Thyroid Cancer No family hx of     Thyroid Disease No family hx of      Social History  Social History     Tobacco Use    Smoking status: Never    Smokeless tobacco: Never   Substance Use Topics    Alcohol use: Yes     Comment: occ    Drug use: Never     Indoor cycling or light weights multiple times/week     Physical Exam  There were no vitals taken for this visit.  There is no height or weight on file to calculate BMI.   BP Readings from Last 1 Encounters:   02/10/23 (!) 132/90      Pulse Readings from Last 1 Encounters:   02/10/23 74      Resp Readings from  "Last 1 Encounters:   08/31/22 16      Temp Readings from Last 1 Encounters:   08/18/22 98.8  F (37.1  C) (Oral)      SpO2 Readings from Last 1 Encounters:   02/10/23 99%      Wt Readings from Last 1 Encounters:   08/31/22 77.9 kg (171 lb 11.2 oz)      Ht Readings from Last 1 Encounters:   08/26/22 1.676 m (5' 6\")     GENERAL: no distress-- I can see from mid chest up; voice normal   SKIN: Visible skin clear. No significant rash, abnormal pigmentation or lesions.  EYES: glasses; Eyes grossly normal to inspection.    NECK: no visible masses;   RESP: No audible wheeze, cough, or  increased work of breathing.    NEURO:   Awake, alert, responds appropriately to questions.  Mentation and speech fluent.  PSYCH:affect normal,  appearance well-groomed.    DATA REVIEW     Latest Ref Rng 9/15/2022  7:49 AM 8/28/2023  1:00 PM   ENDO THYROID LABS-[facility]      TSH 0.40 - 4.00 mU/L 1.09     TSH 0.30 - 4.20 uIU/mL  0.05 (L)    FREE T4 0.90 - 1.70 ng/dL 1.20  2.13 (H)       Legend:  (L) Low  (H) High  on  "

## 2023-09-26 NOTE — PATIENT INSTRUCTIONS
Reduce levothyroxine to 112 mcg/day .  Repeat labs in 3 months.      Labs late December    Once we get the thyroid stabilized we will send you back to you primary care provider for long term follow up

## 2023-09-29 DIAGNOSIS — E89.0 POSTOPERATIVE HYPOTHYROIDISM: Primary | ICD-10-CM

## 2023-10-18 PROBLEM — C73 THYROID CANCER (H): Status: ACTIVE | Noted: 2022-06-24

## 2023-10-18 PROBLEM — M51.26 LUMBAR DISC HERNIATION: Status: ACTIVE | Noted: 2023-10-18

## 2023-10-18 PROBLEM — D32.9 MENINGIOMA (H): Status: ACTIVE | Noted: 2021-10-21

## 2023-10-18 PROBLEM — R91.8 MULTIPLE LUNG NODULES ON CT: Status: ACTIVE | Noted: 2022-06-24

## 2023-10-18 RX ORDER — PREDNISONE 20 MG/1
40 TABLET ORAL
COMMUNITY
Start: 2023-09-18 | End: 2023-12-27

## 2023-11-29 ENCOUNTER — LAB (OUTPATIENT)
Dept: LAB | Facility: CLINIC | Age: 53
End: 2023-11-29
Payer: COMMERCIAL

## 2023-11-29 DIAGNOSIS — E89.0 POSTOPERATIVE HYPOTHYROIDISM: ICD-10-CM

## 2023-11-29 LAB
T4 FREE SERPL-MCNC: 1.79 NG/DL (ref 0.9–1.7)
TSH SERPL DL<=0.005 MIU/L-ACNC: 0.17 UIU/ML (ref 0.3–4.2)

## 2023-11-29 PROCEDURE — 36415 COLL VENOUS BLD VENIPUNCTURE: CPT

## 2023-11-29 PROCEDURE — 84439 ASSAY OF FREE THYROXINE: CPT

## 2023-11-29 PROCEDURE — 84443 ASSAY THYROID STIM HORMONE: CPT

## 2023-12-13 ENCOUNTER — TRANSCRIBE ORDERS (OUTPATIENT)
Dept: OTHER | Age: 53
End: 2023-12-13

## 2023-12-13 DIAGNOSIS — M54.50 LOW BACK PAIN: Primary | ICD-10-CM

## 2023-12-19 DIAGNOSIS — E89.0 POSTOPERATIVE HYPOTHYROIDISM: Primary | ICD-10-CM

## 2023-12-27 ENCOUNTER — VIRTUAL VISIT (OUTPATIENT)
Dept: SURGERY | Facility: CLINIC | Age: 53
End: 2023-12-27
Attending: INTERNAL MEDICINE
Payer: COMMERCIAL

## 2023-12-27 ENCOUNTER — ANCILLARY PROCEDURE (OUTPATIENT)
Dept: CT IMAGING | Facility: CLINIC | Age: 53
End: 2023-12-27
Attending: INTERNAL MEDICINE
Payer: COMMERCIAL

## 2023-12-27 DIAGNOSIS — R91.8 PULMONARY NODULES: Primary | ICD-10-CM

## 2023-12-27 DIAGNOSIS — R91.8 PULMONARY NODULES: ICD-10-CM

## 2023-12-27 PROCEDURE — 99213 OFFICE O/P EST LOW 20 MIN: CPT | Mod: VID | Performed by: CLINICAL NURSE SPECIALIST

## 2023-12-27 PROCEDURE — 71250 CT THORAX DX C-: CPT | Performed by: RADIOLOGY

## 2023-12-27 NOTE — PROGRESS NOTES
Miriam is a 53 year old who is being evaluated via a billable video visit.      How would you like to obtain your AVS? MyChart  If the video visit is dropped, the invitation should be resent by: Text to cell phone: [phone]  Will anyone else be joining your video visit? No      Video-Visit Details    Type of service:  Video Visit     Originating Location (pt. Location): Home    Distant Location (provider location):  On-site  Platform used for Video Visit: [facility]    THORACIC SURGERY FOLLOW UP VISIT    Dear Dr. Falcon,  I saw Ms. Miriam Beverly in follow-up today. The clinical summary follows:     CHIEF COMPLAINT  Lung nodules  INTERVAL STUDIES  CT chest: final report pending at time of clinic visit. To my review the cluster of nodules in the right lower lobe looks similar however, I see what may be the start of some central calcification here.     Past Medical History:   Diagnosis Date    Allergy-induced asthma     Arachnoid cyst of posterior cranial fossa 10/05/2021    3 cm,superior cerebellar cistern; presumptive imaging diagnosis    Gestational hypertension     HTN (hypertension)     Hyperlipidemia     Meningioma (H) 10/05/2021    presumptive imaging diagnsois; 14 mm , left temporal convexity    Postpartum cardiomyopathy     Postsurgical hypothyroidism 2022    Preeclampsia     Pulmonary nodules 2022    PVC's (premature ventricular contractions)     Sinusitis 2017      Past Surgical History:   Procedure Laterality Date     SECTION      GYN SURGERY      MAMMOPLASTY AUGMENTATION  2012    THYROIDECTOMY N/A 2022    Procedure: THYROIDECTOMY, TOTAL;  Surgeon: Sarita Miles MD;  Location: [facility] OR    wisdom teeth extraction        Social History     Socioeconomic History    Marital status:      Spouse name: Not on file    Number of children: Not on file    Years of education: Not on file    Highest education level: Not on file   Occupational History    Not on file    Tobacco Use    Smoking status: Never    Smokeless tobacco: Never   Substance and Sexual Activity    Alcohol use: Yes     Comment: occ    Drug use: Never    Sexual activity: Not on file   Other Topics Concern    Not on file   Social History Narrative    Not on file     Social Determinants of Health     Financial Resource Strain: Not on file   Food Insecurity: Not on file   Transportation Needs: Not on file   Physical Activity: Not on file   Stress: Not on file   Social Connections: Not on file   Interpersonal Safety: Not on file   Housing Stability: Not on file      SUBJECTIVE  Miriam is doing well. She denies any shortness of breath, cough or fevers. Back in September she did have a horrible cough for about 6 weeks. Her primary doctor thought she had pneumonia. She tested for Covid several times but they were all negative. About a month later, her son had the same symptoms as her but he did a PCR for Covid that was positive. Besides this recent respiratory illness, she denies a history of pneumonias or other respiratory illnesses besides the typical colds growing up.    From a personal perspective, she has been having back pain for the last 6 months and will be having a lumbar MRI tomorrow. She is also having a brain MRI for follow up on a meningioma.    IMPRESSION   53 year-old female with a cluster of lung nodules in the right lower lobe.    PLAN  I spent 15 min on the date of the encounter in chart review, patient visit, review of tests, documentation and/or discussion with other providers about the issues documented above. I reviewed the plan as follows:  Follow up on final report from today's chest CT.  Nodule conference this Friday: follow up chest CT in 6 months versus biopsy/surgery (concern for carcinoid?)  All questions were answered and the patient and present family were in agreement with the plan.  I appreciate the opportunity to participate in the care of your patient and will keep you  updated.  Sincerely,

## 2023-12-27 NOTE — LETTER
2023         RE: Miriam Beverly  [address]  [place] [address]        Dear Colleague,    Thank you for referring your patient, Miriam Beverly, to the [facility]. Please see a copy of my visit note below.    Miriam is a 53 year old who is being evaluated via a billable video visit.      How would you like to obtain your AVS? MyChart  If the video visit is dropped, the invitation should be resent by: Text to cell phone: [phone]  Will anyone else be joining your video visit? No      Video-Visit Details    Type of service:  Video Visit     Originating Location (pt. Location): Home    Distant Location (provider location):  On-site  Platform used for Video Visit: [facility]    THORACIC SURGERY FOLLOW UP VISIT    Dear Dr. Falcon,  I saw Ms. Miriam Beverly in follow-up today. The clinical summary follows:     CHIEF COMPLAINT  Lung nodules  INTERVAL STUDIES  CT chest: final report pending at time of clinic visit. To my review the cluster of nodules in the right lower lobe looks similar however, I see what may be the start of some central calcification here.     Past Medical History:   Diagnosis Date     Allergy-induced asthma      Arachnoid cyst of posterior cranial fossa 10/05/2021    3 cm,superior cerebellar cistern; presumptive imaging diagnosis     Gestational hypertension      HTN (hypertension)      Hyperlipidemia      Meningioma (H) 10/05/2021    presumptive imaging diagnsois; 14 mm , left temporal convexity     Postpartum cardiomyopathy      Postsurgical hypothyroidism 2022     Preeclampsia      Pulmonary nodules 2022     PVC's (premature ventricular contractions)      Sinusitis 2017      Past Surgical History:   Procedure Laterality Date      SECTION       GYN SURGERY       MAMMOPLASTY AUGMENTATION  2012     THYROIDECTOMY N/A 2022    Procedure: THYROIDECTOMY, TOTAL;  Surgeon: Sarita Miles MD;  Location:  OR     [place]  teeth extraction        Social History     Socioeconomic History     Marital status:      Spouse name: Not on file     Number of children: Not on file     Years of education: Not on file     Highest education level: Not on file   Occupational History     Not on file   Tobacco Use     Smoking status: Never     Smokeless tobacco: Never   Substance and Sexual Activity     Alcohol use: Yes     Comment: occ     Drug use: Never     Sexual activity: Not on file   Other Topics Concern     Not on file   Social History Narrative     Not on file     Social Determinants of Health     Financial Resource Strain: Not on file   Food Insecurity: Not on file   Transportation Needs: Not on file   Physical Activity: Not on file   Stress: Not on file   Social Connections: Not on file   Interpersonal Safety: Not on file   Housing Stability: Not on file      SUBJECTIVE  Miriam is doing well. She denies any shortness of breath, cough or fevers. Back in September she did have a horrible cough for about 6 weeks. Her primary doctor thought she had pneumonia. She tested for Covid several times but they were all negative. About a month later, her son had the same symptoms as her but he did a PCR for Covid that was positive. Besides this recent respiratory illness, she denies a history of pneumonias or other respiratory illnesses besides the typical colds growing up.    From a personal perspective, she has been having back pain for the last 6 months and will be having a lumbar MRI tomorrow. She is also having a brain MRI for follow up on a meningioma.    IMPRESSION   53 year-old female with a cluster of lung nodules in the right lower lobe.    PLAN  I spent 15 min on the date of the encounter in chart review, patient visit, review of tests, documentation and/or discussion with other providers about the issues documented above. I reviewed the plan as follows:  Follow up on final report from today's chest CT.  Nodule conference this  Friday: follow up chest CT in 6 months versus biopsy/surgery (concern for carcinoid?)  All questions were answered and the patient and present family were in agreement with the plan.  I appreciate the opportunity to participate in the care of your patient and will keep you updated.  Sincerely,          Again, thank you for allowing me to participate in the care of your patient.        Sincerely,        DELIA Warern CNS

## 2023-12-28 ENCOUNTER — ANCILLARY PROCEDURE (OUTPATIENT)
Dept: MRI IMAGING | Facility: CLINIC | Age: 53
End: 2023-12-28
Attending: PHYSICIAN ASSISTANT
Payer: COMMERCIAL

## 2023-12-28 DIAGNOSIS — D32.9 MENINGIOMA (H): ICD-10-CM

## 2023-12-28 DIAGNOSIS — M54.50 LOW BACK PAIN: ICD-10-CM

## 2023-12-28 PROCEDURE — 72148 MRI LUMBAR SPINE W/O DYE: CPT | Mod: TC | Performed by: RADIOLOGY

## 2023-12-28 PROCEDURE — 70553 MRI BRAIN STEM W/O & W/DYE: CPT | Mod: TC | Performed by: RADIOLOGY

## 2023-12-28 PROCEDURE — A9585 GADOBUTROL INJECTION: HCPCS | Performed by: RADIOLOGY

## 2023-12-28 RX ORDER — GADOBUTROL 604.72 MG/ML
7.5 INJECTION INTRAVENOUS ONCE
Status: COMPLETED | OUTPATIENT
Start: 2023-12-28 | End: 2023-12-28

## 2023-12-28 RX ADMIN — GADOBUTROL 7.5 ML: 604.72 INJECTION INTRAVENOUS at 08:28

## 2024-01-07 ENCOUNTER — HEALTH MAINTENANCE LETTER (OUTPATIENT)
Age: 54
End: 2024-01-07

## 2024-01-10 ENCOUNTER — THERAPY VISIT (OUTPATIENT)
Dept: PHYSICAL THERAPY | Facility: CLINIC | Age: 54
End: 2024-01-10
Attending: PHYSICAL MEDICINE & REHABILITATION
Payer: COMMERCIAL

## 2024-01-10 DIAGNOSIS — M54.41 CHRONIC BILATERAL LOW BACK PAIN WITH BILATERAL SCIATICA: Primary | ICD-10-CM

## 2024-01-10 DIAGNOSIS — G89.29 CHRONIC BILATERAL LOW BACK PAIN WITH BILATERAL SCIATICA: Primary | ICD-10-CM

## 2024-01-10 DIAGNOSIS — M54.42 CHRONIC BILATERAL LOW BACK PAIN WITH BILATERAL SCIATICA: Primary | ICD-10-CM

## 2024-01-10 DIAGNOSIS — M54.50 LOW BACK PAIN: ICD-10-CM

## 2024-01-10 PROCEDURE — 97161 PT EVAL LOW COMPLEX 20 MIN: CPT | Mod: GP

## 2024-01-10 PROCEDURE — 97110 THERAPEUTIC EXERCISES: CPT | Mod: GP

## 2024-01-10 NOTE — PROGRESS NOTES
PHYSICAL THERAPY EVALUATION  Type of Visit: Evaluation    See electronic medical record for Abuse and Falls Screening details.    Subjective       Presenting condition or subjective complaint: Consistemt and chronic low back pain  Date of onset: 12/13/23    Relevant medical history: Depression; Heart problems; High blood pressure; Menopause; Osteoarthritis; Pain at night or rest; Thyroid problems   Dates & types of surgery: Thyroidectomy- 2022, breast augmentation 2012, c section 2002    Prior diagnostic imaging/testing results: MRI     Prior therapy history for the same diagnosis, illness or injury: No      Prior Level of Function  Transfers:   Ambulation:   ADL:   IADL:     Living Environment  Social support: With a significant other or spouse   Type of home: [facility]   Stairs to enter the home: Yes   Is there a railing: Yes   Ramp: No   Stairs inside the home: Yes   Is there a railing: Yes   Help at home: None  Equipment owned:       Employment: Yes [employer] at [employer]  Hobbies/Interests: Weight lifting, writing, reading, imdoor cycling    Subjective Summary: She reports a history of a herniation 2 years ago and had all sorts of weird symptoms and tingling not just in her legs.  The only known injury she's had to her back was in 2015 where she did quite a bit of power lifting competitions.  She reports on one instance through warm-up reps during her power lifting training, she said felt quite  an extreme flare up resulting in her stopping her competitive lifting at a high level.  She also reports once Covid hit she did take on a master program, reduced her activity, increased her sitting with her job and her studies.  She says now she has most her pain with little day to day activities like sitting on certain surfaces and difficulties getting out of bed.  She reports she can do light RDLs such as 10-15 lbs with tension in her back and can bike on the peloton.  She has pain with most bending and also has  "pain with shoveling.  She reports overhead presses with light weight she can do ok. She reports leaning over on a sink can flare things up. What's different now as opposed to 2 years ago, she says that her day to day was intermittently impacted where as now it's constantly impacted.  She has sharp pain with certain movements, dull constant aching, pain in the right lateral hip/glute as well as the left glute. She reports no shooting pains down her leg now, but did have these two years ago (Doesn't remember which leg).  She does report tingling in her hands and feet, but this has also occurred prior to her back pain. She reports her back is \"often\" tight.     Patient goals for therapy: Lift heavier weights, impact exercises, get dressed and move around without pain    Pain assessment: Location: Low Back Pain /Rating: 3/10     Objective   LUMBAR SPINE EVALUATION  PAIN: Pain Level at Rest: 0/10  Pain Level with Use: 6/10  Pain Location: lumbar spine  Pain Quality: Aching, Burning, Dull, Pressure, Sharp, Shooting, and Stabbing  Pain is Exacerbated By: Deadlifting, Getting out of bed, leaning forward, bending forward  Pain is Relieved By: heat, rest, and use  Pain Progression: Worsened  INTEGUMENTARY (edema, incisions):   POSTURE:  No visible or obvious shift  GAIT:   Weightbearing Status:   Assistive Device(s):   Gait Deviations:   BALANCE/PROPRIOCEPTION:   WEIGHTBEARING ALIGNMENT:   NON-WEIGHTBEARING ALIGNMENT:    ROM: AROM WNL  Patient demonstrated full lumbar ROM in all directions  PELVIC/SI SCREEN:   STRENGTH: WNL, All MMT 5/5 with no myotomal loss  MDT: Repeated Lumbar Flexion in Standing: Improved during but No Effect Afterward.  Repeated Lumbar Extension in Prone:  Prone Lying x1 minute (Increased during)  Prone on Elbows x2 minutes (Increased but Centralized During  Prone Press Up 2x10 (Decreased during and Centralized)  Unilateral Press Up 1x10 (No Effect and No Effect)  Prone Press Up with Pillow under thighs " 1x10 (Decreased during and centralized, but not as effective as standard press up)  Right Lateral Side Glide 2x10 (Centralized during and Slight decreased, but otherwise NE)    Directional Preference: Extension Responder  MYOTOMES: WNL  All MMT 5/5 with no myotomal loss  DTR S:   CORD SIGNS:   DERMATOMES:   NEURAL TENSION: Lumbar WNL  Positive Bilaterally  FLEXIBILITY:   LUMBAR/HIP Special Tests:    PELVIS/SI SPECIAL TESTS:   FUNCTIONAL TESTS:   PALPATION:   SPINAL SEGMENTAL CONCLUSIONS:       Assessment & Plan   CLINICAL IMPRESSIONS  Medical Diagnosis: Low Back Pain    Treatment Diagnosis: Chronic Low Back Pain with Bilateral sciatica   Impression/Assessment: Patient is a 53 year old female with Chronic Low Back Pain complaints.  The following significant findings have been identified: Pain, Decreased strength, Impaired gait, Impaired muscle performance, and Decreased activity tolerance. These impairments interfere with their ability to perform self care tasks, work tasks, recreational activities, household chores, driving , household mobility, and community mobility as compared to previous level of function.     Clinical Decision Making (Complexity):  Clinical Presentation: Stable/Uncomplicated  Clinical Presentation Rationale: based on medical and personal factors listed in PT evaluation  Clinical Decision Making (Complexity): Low complexity    PLAN OF CARE  Treatment Interventions:  Interventions: Manual Therapy, Neuromuscular Re-education, Therapeutic Activity, Therapeutic Exercise    Long Term Goals     PT Goal 1  Goal Identifier: Bending, Sitting and Leaning Forward  Goal Description: Patient will be able to bend forward and touch her toes, sit at work during a two hour meeting, get out of bed and lean forward to wash hands/do make up without low back worse than 2/10 or radiating into the glutes bilaterally  Rationale: to maximize safety and independence with performance of ADLs and functional tasks;to  maximize safety and independence with transportation;to maximize safety and independence within the home;to maximize safety and independence within the community;to maximize safety and independence with self cares  Goal Progress: Currently increased pain with prolonged sitting, bending forward and leaning over a counter causes low back pain  Target Date: 03/06/24      Frequency of Treatment: 1x a week  Duration of Treatment: 12 weeks    Recommended Referrals to Other Professionals:   Education Assessment:        Risks and benefits of evaluation/treatment have been explained.   Patient/Family/caregiver agrees with Plan of Care.     Evaluation Time:     PT Eval, Low Complexity Minutes (23156): 33       Signing Clinician: Hal Peterson PT

## 2024-01-18 ENCOUNTER — THERAPY VISIT (OUTPATIENT)
Dept: PHYSICAL THERAPY | Facility: CLINIC | Age: 54
End: 2024-01-18
Payer: COMMERCIAL

## 2024-01-18 DIAGNOSIS — G89.29 CHRONIC BILATERAL LOW BACK PAIN WITH BILATERAL SCIATICA: Primary | ICD-10-CM

## 2024-01-18 DIAGNOSIS — M54.42 CHRONIC BILATERAL LOW BACK PAIN WITH BILATERAL SCIATICA: Primary | ICD-10-CM

## 2024-01-18 DIAGNOSIS — M54.41 CHRONIC BILATERAL LOW BACK PAIN WITH BILATERAL SCIATICA: Primary | ICD-10-CM

## 2024-01-18 PROCEDURE — 97110 THERAPEUTIC EXERCISES: CPT | Mod: GP

## 2024-01-18 PROCEDURE — 97140 MANUAL THERAPY 1/> REGIONS: CPT | Mod: GP

## 2024-01-24 ENCOUNTER — THERAPY VISIT (OUTPATIENT)
Dept: PHYSICAL THERAPY | Facility: CLINIC | Age: 54
End: 2024-01-24
Payer: COMMERCIAL

## 2024-01-24 DIAGNOSIS — M54.42 CHRONIC BILATERAL LOW BACK PAIN WITH BILATERAL SCIATICA: Primary | ICD-10-CM

## 2024-01-24 DIAGNOSIS — G89.29 CHRONIC BILATERAL LOW BACK PAIN WITH BILATERAL SCIATICA: Primary | ICD-10-CM

## 2024-01-24 DIAGNOSIS — M54.41 CHRONIC BILATERAL LOW BACK PAIN WITH BILATERAL SCIATICA: Primary | ICD-10-CM

## 2024-01-24 PROCEDURE — 97140 MANUAL THERAPY 1/> REGIONS: CPT | Mod: GP

## 2024-01-24 PROCEDURE — 97110 THERAPEUTIC EXERCISES: CPT | Mod: GP

## 2024-02-02 ENCOUNTER — OFFICE VISIT (OUTPATIENT)
Dept: NEUROSURGERY | Facility: CLINIC | Age: 54
End: 2024-02-02
Payer: COMMERCIAL

## 2024-02-02 VITALS
RESPIRATION RATE: 18 BRPM | BODY MASS INDEX: 28.45 KG/M2 | SYSTOLIC BLOOD PRESSURE: 113 MMHG | HEIGHT: 66 IN | OXYGEN SATURATION: 97 % | DIASTOLIC BLOOD PRESSURE: 77 MMHG | HEART RATE: 62 BPM | WEIGHT: 177 LBS

## 2024-02-02 DIAGNOSIS — D32.9 MENINGIOMA (H): Primary | ICD-10-CM

## 2024-02-02 PROCEDURE — 99213 OFFICE O/P EST LOW 20 MIN: CPT | Performed by: PHYSICIAN ASSISTANT

## 2024-02-02 RX ORDER — VENLAFAXINE HYDROCHLORIDE 37.5 MG/1
37.5 TABLET, EXTENDED RELEASE ORAL
COMMUNITY
Start: 2024-01-17

## 2024-02-02 ASSESSMENT — PAIN SCALES - GENERAL: PAINLEVEL: NO PAIN (0)

## 2024-02-02 NOTE — LETTER
"2024       RE: Miriam Beverly  [address]  [facility] [address]     Dear Colleague,    Thank you for referring your patient, Miriam Beverly, to the [facility] NEUROSURGERY [facility]INIC [place] [facility]. Please see a copy of my visit note below.      [facility]  Department of Neurosurgery  Center for Skull Base and Pituitary Surgery    Name: Miriam Beverly  MRN: [id number]  Age: 53 year old  : 2024      Chief Complaint:   Left temporal meningioma, follow up visit    History of Present Illness:   Miriam Beverly is a 53 year old female with a history of gestational hypertension with secondary cardiomyopathy and thyroid nodule status post total thyroidectomy who is seen today for annual follow up. Her left temporal meningioma was discovered in  upon workup for new paresthesias of all extremities. Today she reports feeling well. Has had some trouble with back pain and is working with Physical Therapy currently. She denies new neurologic complaints.       Review of Systems:   Pertinent items are noted in HPI or as in patient entered ROS below, remainder of complete ROS is negative.     Physical Exam:   /77   Pulse 62   Resp 18   Ht 1.676 m (5' 6\")   Wt 80.3 kg (177 lb)   SpO2 97%   BMI 28.57 kg/m     General: No acute distress.    Eyes: Conjunctivae are normal.  MSK: Moves all extremities.  No obvious deformity.  Neuro: The patient is fully oriented. Speech is normal. Facial nerve function is normal, rated as a House Brackmann 1.  Gait is normal.  Psych: Normal mood and affect. Behavior is normal.      Imaging:  We reviewed the MRI done 2023 which shows a stable left temporal meningioma without surrounding FLAIR changes.      Assessment:  Left temporal meningioma, follow up visit    Plan:  We reviewed her MRI findings which are favorable. We'll plan to repeat her imaging in 1 year and she was " encouraged to reach out sooner with concerns.          Again, thank you for allowing me to participate in the care of your patient.      Sincerely,    Sherley Cox PA-C

## 2024-02-02 NOTE — PROGRESS NOTES
"  [facility]  Department of Neurosurgery  Center for Skull Base and Pituitary Surgery    Name: Miriam Beverly  MRN: [id number]  Age: 53 year old  : 2024      Chief Complaint:   Left temporal meningioma, follow up visit    History of Present Illness:   Miriam Beverly is a 53 year old female with a history of gestational hypertension with secondary cardiomyopathy and thyroid nodule status post total thyroidectomy who is seen today for annual follow up. Her left temporal meningioma was discovered in  upon workup for new paresthesias of all extremities. Today she reports feeling well. Has had some trouble with back pain and is working with Physical Therapy currently. She denies new neurologic complaints.       Review of Systems:   Pertinent items are noted in HPI or as in patient entered ROS below, remainder of complete ROS is negative.     Physical Exam:   /77   Pulse 62   Resp 18   Ht 1.676 m (5' 6\")   Wt 80.3 kg (177 lb)   SpO2 97%   BMI 28.57 kg/m     General: No acute distress.    Eyes: Conjunctivae are normal.  MSK: Moves all extremities.  No obvious deformity.  Neuro: The patient is fully oriented. Speech is normal. Facial nerve function is normal, rated as a House Brackmann 1.  Gait is normal.  Psych: Normal mood and affect. Behavior is normal.      Imaging:  We reviewed the MRI done 2023 which shows a stable left temporal meningioma without surrounding FLAIR changes.      Assessment:  Left temporal meningioma, follow up visit    Plan:  We reviewed her MRI findings which are favorable. We'll plan to repeat her imaging in 1 year and she was encouraged to reach out sooner with concerns.        Sherley Cox PA-C  Department of Neurosurgery    "

## 2024-02-05 ENCOUNTER — TELEPHONE (OUTPATIENT)
Dept: NEUROSURGERY | Facility: CLINIC | Age: 54
End: 2024-02-05

## 2024-02-05 ENCOUNTER — THERAPY VISIT (OUTPATIENT)
Dept: PHYSICAL THERAPY | Facility: CLINIC | Age: 54
End: 2024-02-05
Payer: COMMERCIAL

## 2024-02-05 DIAGNOSIS — G89.29 CHRONIC BILATERAL LOW BACK PAIN WITH BILATERAL SCIATICA: Primary | ICD-10-CM

## 2024-02-05 DIAGNOSIS — M54.42 CHRONIC BILATERAL LOW BACK PAIN WITH BILATERAL SCIATICA: Primary | ICD-10-CM

## 2024-02-05 DIAGNOSIS — M54.41 CHRONIC BILATERAL LOW BACK PAIN WITH BILATERAL SCIATICA: Primary | ICD-10-CM

## 2024-02-05 PROCEDURE — 97140 MANUAL THERAPY 1/> REGIONS: CPT | Mod: GP

## 2024-02-05 PROCEDURE — 97110 THERAPEUTIC EXERCISES: CPT | Mod: GP

## 2024-02-05 NOTE — TELEPHONE ENCOUNTER
Left Voicemail (1st Attempt) and Sent Mychart (1st Attempt) for the patient to call back and schedule the following:    Appointment type: Return Tumor Neurosurg  Provider: Sherley Cox PA-C  Return date: Around 2/2/2025  Specialty phone number: [phone]  Additional appointment(s) needed: Brain MRI w/wo contrast prior   Additional Notes: N/A    Ced Bird on 2/5/2024 at 11:00 AM

## 2024-02-12 ENCOUNTER — TELEPHONE (OUTPATIENT)
Dept: NEUROSURGERY | Facility: CLINIC | Age: 54
End: 2024-02-12
Payer: COMMERCIAL

## 2024-02-12 NOTE — TELEPHONE ENCOUNTER
Left Voicemail (2nd Attempt) for the patient to call back and schedule the following:    Appointment type: Return Tumor Neurosurg (virtual or in person ok)  Provider: Sherley Cox PA-C  Return date: Around 2/2/2025  Specialty phone number: [phone]  Additional appointment(s) needed: Brain MRI w/wo contrast prior   Additional Notes: N/A    Ced Bird on 2/12/2024 at 5:12 PM

## 2024-02-26 ENCOUNTER — LAB (OUTPATIENT)
Dept: LAB | Facility: CLINIC | Age: 54
End: 2024-02-26
Payer: COMMERCIAL

## 2024-02-26 DIAGNOSIS — E89.0 POSTOPERATIVE HYPOTHYROIDISM: ICD-10-CM

## 2024-02-26 LAB
T4 FREE SERPL-MCNC: 1.69 NG/DL (ref 0.9–1.7)
TSH SERPL DL<=0.005 MIU/L-ACNC: 0.32 UIU/ML (ref 0.3–4.2)

## 2024-02-26 PROCEDURE — 84443 ASSAY THYROID STIM HORMONE: CPT

## 2024-02-26 PROCEDURE — 84439 ASSAY OF FREE THYROXINE: CPT

## 2024-02-26 PROCEDURE — 36415 COLL VENOUS BLD VENIPUNCTURE: CPT

## 2024-02-27 DIAGNOSIS — E89.0 POSTOPERATIVE HYPOTHYROIDISM: Primary | ICD-10-CM

## 2024-02-27 RX ORDER — LEVOTHYROXINE SODIUM 100 UG/1
TABLET ORAL
Qty: 96 TABLET | Refills: 4 | Status: SHIPPED | OUTPATIENT
Start: 2024-02-27 | End: 2024-05-22

## 2024-03-06 ENCOUNTER — THERAPY VISIT (OUTPATIENT)
Dept: PHYSICAL THERAPY | Facility: CLINIC | Age: 54
End: 2024-03-06
Payer: COMMERCIAL

## 2024-03-06 DIAGNOSIS — G89.29 CHRONIC BILATERAL LOW BACK PAIN WITH BILATERAL SCIATICA: Primary | ICD-10-CM

## 2024-03-06 DIAGNOSIS — M54.41 CHRONIC BILATERAL LOW BACK PAIN WITH BILATERAL SCIATICA: Primary | ICD-10-CM

## 2024-03-06 DIAGNOSIS — M54.42 CHRONIC BILATERAL LOW BACK PAIN WITH BILATERAL SCIATICA: Primary | ICD-10-CM

## 2024-03-06 PROCEDURE — 97110 THERAPEUTIC EXERCISES: CPT | Mod: GP

## 2024-03-26 DIAGNOSIS — D34 FOLLICULAR ADENOMA OF THYROID GLAND: ICD-10-CM

## 2024-03-26 DIAGNOSIS — E89.0 POSTOPERATIVE HYPOTHYROIDISM: Primary | ICD-10-CM

## 2024-03-26 DIAGNOSIS — Z78.0 POST-MENOPAUSE: ICD-10-CM

## 2024-04-01 ENCOUNTER — LAB (OUTPATIENT)
Dept: LAB | Facility: CLINIC | Age: 54
End: 2024-04-01
Payer: COMMERCIAL

## 2024-04-01 DIAGNOSIS — D34 FOLLICULAR ADENOMA OF THYROID GLAND: ICD-10-CM

## 2024-04-01 DIAGNOSIS — Z78.0 POST-MENOPAUSE: ICD-10-CM

## 2024-04-01 DIAGNOSIS — E89.0 POSTOPERATIVE HYPOTHYROIDISM: ICD-10-CM

## 2024-04-01 LAB
T4 FREE SERPL-MCNC: 1.76 NG/DL (ref 0.9–1.7)
TSH SERPL DL<=0.005 MIU/L-ACNC: 0.17 UIU/ML (ref 0.3–4.2)

## 2024-04-01 PROCEDURE — 84443 ASSAY THYROID STIM HORMONE: CPT

## 2024-04-01 PROCEDURE — 36415 COLL VENOUS BLD VENIPUNCTURE: CPT

## 2024-04-01 PROCEDURE — 84439 ASSAY OF FREE THYROXINE: CPT

## 2024-04-02 RX ORDER — ESTRADIOL 0.05 MG/D
1 PATCH, EXTENDED RELEASE TRANSDERMAL
COMMUNITY
Start: 2024-03-05

## 2024-04-11 NOTE — PROGRESS NOTES
"   03/06/24 0500   Appointment Info   Signing clinician's name / credentials Hal Peterson, PT, DPT, CSCS, CLT   Total/Authorized Visits E&T   Visits Used 5   Medical Diagnosis Low Back Pain   PT Tx Diagnosis Chronic Low Back Pain with Bilateral sciatica   Progress Note/Certification   Onset of illness/injury or Date of Surgery 12/13/23   Therapy Frequency Every other week   Predicted Duration 12 weeks   Progress Note Due Date 04/03/24   Progress Note Completed Date 01/10/24   PT Goal 1   Goal Identifier Bending, Sitting and Leaning Forward   Goal Description Patient will be able to bend forward and touch her toes, sit at work during a two hour meeting, get out of bed and lean forward to wash hands/do make up without low back worse than 2/10 or radiating into the glutes bilaterally   Rationale to maximize safety and independence with performance of ADLs and functional tasks;to maximize safety and independence with transportation;to maximize safety and independence within the home;to maximize safety and independence within the community;to maximize safety and independence with self cares   Goal Progress Able to perform repeated flexion without pain, repeated squats without pain, and deadlifts without pain in the back. 0/10   Target Date 03/06/24   Date Met 03/06/24   Subjective Report   Subjective Report She reports the last month has going pretty good. She is sitting more with less pain \"Because work is bonkers\".  She finds that she is sitting a lot and it's actually potentially getting better.  She reports she hasn't had the need to stand up or do her extensions.  She reports the only thing that hasn't changed is the mornings, which are still super stiff and painful.  She reports that she still has stiffness with bending forward and a lot pain with sneezing. Dog poop is going well. She reports biking for 5-10 minutes is painfree   Objective Measures   Objective Measures Objective Measure 1;Objective Measure " 2;Objective Measure 3   Objective Measure 1   Objective Measure Directional Preference   Details Extension Responder   Objective Measure 2   Objective Measure Sitting Tolerance   Details Was able to sit for 8 hours using a lumbar roll without pain (did have lumbar stiffness)   Objective Measure 3   Objective Measure Return to Function   Details Squats Pain initially, but no pain with repeated reps afterward. Supine Double Knee to Chest: 10 reps no pain or symptoms.  All painfree.  Repeated Standing Lumbar Flexion 1x10 with 10th rep for 15 second hold (All painfree and actually felt good).   Treatment Interventions (PT)   Interventions Therapeutic Procedure/Exercise   Therapeutic Procedure/Exercise   Therapeutic Procedures: strength, endurance, ROM, flexibility minutes (15549) 38   PTRx Ther Proc 1 Prone Press Ups   PTRx Ther Proc 1 - Details 1x25 with 8 degrees   PTRx Ther Proc 2 Shoulder Theraband Rows   PTRx Ther Proc 2 - Details 2x30 Black Band   PTRx Ther Proc 3 Shoulder Theraband Low Row/Pulldown   PTRx Ther Proc 3 - Details 2x30 with Black Band   PTRx Ther Proc 4 Bridging #1   PTRx Ther Proc 4 - Details 2x30   Skilled Intervention Directional Preference with force progression to pillows under thighs   Patient Response/Progress Responding nicely and improved flexion tolerance. Force progression to pillow under thighs worked really well   PTRx Ther Proc 5 Squat   PTRx Ther Proc 5 - Details 1x30 Leg Press squats with 50lb 1x20 BW squats    PTRx Ther Proc 6 Double Knee to Chest   PTRx Ther Proc 6 - Details 2 x 30 second hold   PTRx Ther Proc 7 Lumbar Flexion in Standing with/without Patient Overpressure   PTRx Ther Proc 7 - Details 1x10 without overpressure   PTRx Ther Proc 8 Hip Hinge Bar Slide Down Thighs   PTRx Ther Proc 8 - Details RDL with 10 lb dumbbells in each hand   PTRx Ther Proc 9 Pretzel Stretch   PTRx Ther Proc 9 - Details 2x30 seconds   PTRx Ther Proc 10 Lumbar Flexion Rotation   PTRx Ther Proc 10 -  Details 2x30 seconds   Therapeutic Activity   PTRx Ther Act 1 Posture Correction with Lumbar Roll   PTRx Ther Act 1 - Details Reviewed   Plan   Home program See PTRx   Updates to plan of care Discharge   Plan for next session Discharge   Total Session Time   Timed Code Treatment Minutes 38   Total Treatment Time (sum of timed and untimed services) 38           DISCHARGE  Reason for Discharge: Patient has met all goals.  Patient chooses to discontinue therapy.    Equipment Issued: None    Discharge Plan: Patient to continue home program.  We had elected to have one final therapy session for standard discharge procedure such as finalized objective measures, however the patient elected to cancel her final session.    Referring Provider:  Bari Guillen

## 2024-05-22 ENCOUNTER — LAB (OUTPATIENT)
Dept: LAB | Facility: CLINIC | Age: 54
End: 2024-05-22
Payer: COMMERCIAL

## 2024-05-22 DIAGNOSIS — E89.0 POSTOPERATIVE HYPOTHYROIDISM: ICD-10-CM

## 2024-05-22 LAB
T4 FREE SERPL-MCNC: 1.66 NG/DL (ref 0.9–1.7)
TSH SERPL DL<=0.005 MIU/L-ACNC: 0.12 UIU/ML (ref 0.3–4.2)

## 2024-05-22 PROCEDURE — 84439 ASSAY OF FREE THYROXINE: CPT

## 2024-05-22 PROCEDURE — 84443 ASSAY THYROID STIM HORMONE: CPT

## 2024-05-22 PROCEDURE — 36415 COLL VENOUS BLD VENIPUNCTURE: CPT

## 2024-05-22 RX ORDER — LEVOTHYROXINE SODIUM 100 UG/1
100 TABLET ORAL DAILY
Qty: 90 TABLET | Refills: 4 | Status: SHIPPED | OUTPATIENT
Start: 2024-05-22

## 2024-08-05 ENCOUNTER — CARE COORDINATION (OUTPATIENT)
Dept: CARDIOLOGY | Facility: CLINIC | Age: 54
End: 2024-08-05

## 2024-08-05 NOTE — PROGRESS NOTES
New Heart Failure Referral       Demographics:  [address]  [facility] [address]   Home Phone [phone]   Mobile [phone]        Referred By:   Marah Tomas     Background:  Urgent referral - Possible genetic referral for non-ischemic cardiomyopathy or peripartum cardiomyopathy     Plan:   Under Review     Attempts to Contact:

## 2024-08-06 NOTE — PROGRESS NOTES
After reviewing care everywhere notes (Dr. Tomas note 6/25/24) it appears that this referral is for genetics testing.  She is not being referred for postpartum cardiomyopathy, just genetics testing. Forwarding to the Cardiogenetics Team

## 2024-08-08 ENCOUNTER — LAB (OUTPATIENT)
Dept: LAB | Facility: CLINIC | Age: 54
End: 2024-08-08
Payer: COMMERCIAL

## 2024-08-08 DIAGNOSIS — E89.0 POSTOPERATIVE HYPOTHYROIDISM: ICD-10-CM

## 2024-08-08 LAB
T4 FREE SERPL-MCNC: 1.58 NG/DL (ref 0.9–1.7)
TSH SERPL DL<=0.005 MIU/L-ACNC: 0.89 UIU/ML (ref 0.3–4.2)

## 2024-08-08 PROCEDURE — 36415 COLL VENOUS BLD VENIPUNCTURE: CPT

## 2024-08-08 PROCEDURE — 84439 ASSAY OF FREE THYROXINE: CPT

## 2024-08-08 PROCEDURE — 84443 ASSAY THYROID STIM HORMONE: CPT

## 2024-10-15 ENCOUNTER — TELEPHONE (OUTPATIENT)
Dept: CARDIOLOGY | Facility: CLINIC | Age: 54
End: 2024-10-15
Payer: COMMERCIAL

## 2024-10-15 NOTE — TELEPHONE ENCOUNTER
LM to offer sooner appointment for GC with Idania's arrival.  Provided scheduling number if pt wants to be seen sooner.  Shannan

## 2024-10-31 ENCOUNTER — MYC MEDICAL ADVICE (OUTPATIENT)
Dept: SURGERY | Facility: CLINIC | Age: 54
End: 2024-10-31
Payer: COMMERCIAL

## 2024-10-31 DIAGNOSIS — R91.8 PULMONARY NODULES: Primary | ICD-10-CM

## 2024-10-31 NOTE — TELEPHONE ENCOUNTER
Please see patient's mychart message. Per 12/27/23 mychart message to patient, a 1 year follow up chest CT was advised.    Pended new order.    Hanny Orta RN on 10/31/2024 at 3:38 PM

## 2024-11-01 DIAGNOSIS — R91.8 PULMONARY NODULES: Primary | ICD-10-CM

## 2024-11-01 NOTE — PROGRESS NOTES
"Here is a copy of the progress note from your recent genetic counseling visit to the [facility] on Date: 2024.    PROGRESS NOTE: Miriam was referred by  Amy Tomas Md from [facility]   for genetic counseling due to her history of peripartum cardiomyopathy (PPCM).  I had the opportunity to talk with Miriam today to discuss the genetic component of PPCM and testing options available to her.     MEDICAL HISTORY: Miriam is a 54 year-old female with a history of postpartum cardiomyopathy. She gave birth to her son in  then a few years later in  she was experiencing fatigue, SOB, and tingling in her arms and was diagnosed with cardiomyopathy with a LVEF of 40%. She was started on metoprolol and  lisinopril at this time and her ejection fraction has improved to 55% and has been sustained. She had a CT coronary angiogram in  which showed a calcium score of zero and no significant coronary artery disease.     Her other past medical history is significant for HTN, ovarian cysts, uterine polyps, pulmonary nodules, meningioma, total thyroidectomy, and back pain.    FAMILY HISTORY: A detailed family history was obtained today and was significant for the following cardiac history:    Children  Son, 22: in good health. He has not had cardiac screening.  Paternal half-siblings  Aby, 40: atrial ectopic tachycardia and vasal vagal syncope. She has a 10 year-old daughter who is in good health  Shannan, 38: \"heart issues\", no details known. She has two children, 3 and 5, who are in good health.  Brother, 36: in good health. He has not had cardiac screening.  Maternal history  Mother, 77: in good health.  Two aunts in their 60s and 70s in good health  Uncle, 70s: had a \"cardiac event\" in his 60s, no details known. He has three daughters and one has had a cardiac ablation.  Grandfather,  at 38 from a massive stroke and brain bleed. He had a history of " "hypertension.  Grandmother,  in her 80's. She had a history of mini strokes and dementia. Autopsy reported \"heart stuff\" but no further details are known.  Paternal history  Father, 77: prostate cancer  Uncle, 70s: in good health  Aunt: 70s: in good health  Aunt,  in her 50's from diabetes complications. She had \"heart issues\"- no details known but she did not have a pacemaker.  Grandfather,  at 80 from a MI.  Grandmother,  at 90 from dementia. She had a history of breast cancer.    There is no additional history of cardiomyopathy, sudden cardiac death, genetic conditions, or birth defects. (Scanned pedigree may be under media tab)    DISCUSSION:  Non-ischemic cardiomyopathy results from causes other than loss of blood flow to the heart.  Cardiomyopathy can be caused by both acquired and genetic causes.  Acquired causes include exposure to toxins, injury related to coronary artery disease (ischemic), and chronic high blood pressure. When familial, it often results in dilated cardiomyopathy (DCM), where the left ventricle gets enlarged or stretched out.  There is a genetic basis found in 20-50% of DCM cases.     Genetic causes of postpartum cardiomyopathy (PPCM) is an active area of research. Evidence supporting a genetic basis for PPCM and overlap with familial DCM has emerged from observations of familial disease and sequencing of PPCM cohorts. Approximately 20% of PPCM patients screened for cardiomyopathy genes have an identified pathogenic mutation (PMID: 16767531).     Reviewed autosomal dominant (AD) inheritance pattern most commonly associated with DCM, including the 50% risk for recurrence. Briefly reviewed autosomal recessive and X-linked inheritance. Explained that DCM gene mutations are associated with reduced penetrance and variable expressivity, meaning that individuals who carry a gene mutation may or may not get the disease and onset and severity can vary from one family member to the " next. This explains why you may not see cardiomyopathy in each generation of a family.     Genetic testing is indicated for individuals with cardiomyopathy to better understand the cause of their heart disease, progression, the likelihood of noncardiac health risks, availability of gene therapy or enzyme replacement therapies, and risk to relatives. Genetic testing that includes a panel of genes is the most cost effective and timely approach. Reviewed capabilities, limitations, and logistics of testing. Currently over 40 genes have been found to be related to DCM.     DNA sample via saliva or blood is collected and sent to testing lab for evaluation of selected genes. The results could directly impact care and treatment. This testing is medically necessary.     Explained three possible outcomes of genetic testing including: positive identification of a mutation, no mutation identified, and identification of a variant of unknown significance (VUS). If a mutation is identified, presymptomatic testing would be available to at risk family members. If no mutation is identified, it does not rule out the possibility of a genetic component to this disease. Family members could still be at risk for developing the same condition. If a VUS is identified, it is unclear if the mutation is disease causing or just a normal variation. It may take time and possibly additional testing to determine the meaning of a VUS result.     Test results take approximately 2-4 weeks on average. Discussed cost of testing through commercial labs. Explained that the lab works with insurance to determine coverage and will contact patient if out of pocket costs are expected to exceed $100. If so, patient has the option to pay reported price, cancel testing or elect self pay pricing (typcially around $250).     Discussed pros and cons of genetic testing. Explained that results could determine the cause for dilated cardiomyopathy. If a mutation is  identified, presymptomatic testing is available to all at risk relatives. Reviewed possible issues associated with presymptomatic testing including genetic discrimination, current laws to prevent discrimination (ie. YUSUF), insurance issues, and emotional and psychosocial outcomes of testing.     Explained that clinical evaluation is recommended for all first degree relatives (parents, siblings, and children) of an affected individual regardless of decision to pursue genetic testing. Based on the Heart Failure Society of [place] Practice Guidelines (Danish et al, 2018), clinical evaluation should be performed at least every 3-5 years beginning and childhood and should include history, cardiac exam, ECHO, and EKG.    All questions answered at this time.      PLAN: Miriam elected to proceed with genetic testing.  Requisition and consent forms were completed and signed.  DNA will be collected via saliva sample and sent to [facility] laboratory. I will contact patient when results are available.     TOTAL TIME SPENT IN COUNSELIN minutes    Idania Chahal MS, [facility]  Licensed Genetic Counselor  [facility]  [facility]

## 2024-11-05 ENCOUNTER — OFFICE VISIT (OUTPATIENT)
Dept: CARDIOLOGY | Facility: CLINIC | Age: 54
End: 2024-11-05
Attending: GENETIC COUNSELOR, MS
Payer: COMMERCIAL

## 2024-11-05 PROCEDURE — 96040 HC GENETIC COUNSELING, EACH 30 MINUTES: CPT

## 2024-11-05 NOTE — LETTER
"11/5/2024      RE: Miriam Beverly  [address]  [facility] [place] [address]       Dear Colleague,    Thank you for the opportunity to participate in the care of your patient, Miriam Beverly, at the [facility]. Please see a copy of my visit note below.    Here is a copy of the progress note from your recent genetic counseling visit to the [facility] on Date: 11/05/2024.    PROGRESS NOTE: Miriam was referred by  Amy Tomas Md from [facility]   for genetic counseling due to her history of peripartum cardiomyopathy (PPCM).  I had the opportunity to talk with Miriam today to discuss the genetic component of PPCM and testing options available to her.     MEDICAL HISTORY: Miriam is a 54 year-old female with a history of postpartum cardiomyopathy. She gave birth to her son in 2002 then a few years later in 2005 she was experiencing fatigue, SOB, and tingling in her arms and was diagnosed with cardiomyopathy with a LVEF of 40%. She was started on metoprolol and  lisinopril at this time and her ejection fraction has improved to 55% and has been sustained. She had a CT coronary angiogram in 2011 which showed a calcium score of zero and no significant coronary artery disease.     Her other past medical history is significant for HTN, ovarian cysts, uterine polyps, pulmonary nodules, meningioma, total thyroidectomy, and back pain.    FAMILY HISTORY: A detailed family history was obtained today and was significant for the following cardiac history:    Children  Son, 22: in good health. He has not had cardiac screening.  Paternal half-siblings  Aby, 40: atrial ectopic tachycardia and vasal vagal syncope. She has a 10 year-old daughter who is in good health  Shannan, 38: \"heart issues\", no details known. She has two children, 3 and 5, who are in good health.  Brother, 36: in good " "health. He has not had cardiac screening.  Maternal history  Mother, 77: in good health.  Two aunts in their 60s and 70s in good health  Uncle, 70s: had a \"cardiac event\" in his 60s, no details known. He has three daughters and one has had a cardiac ablation.  Grandfather,  at 38 from a massive stroke and brain bleed. He had a history of hypertension.  Grandmother,  in her 80's. She had a history of mini strokes and dementia. Autopsy reported \"heart stuff\" but no further details are known.  Paternal history  Father, 77: prostate cancer  Uncle, 70s: in good health  Aunt: 70s: in good health  Aunt,  in her 50's from diabetes complications. She had \"heart issues\"- no details known but she did not have a pacemaker.  Grandfather,  at 80 from a MI.  Grandmother,  at 90 from dementia. She had a history of breast cancer.    There is no additional history of cardiomyopathy, sudden cardiac death, genetic conditions, or birth defects. (Scanned pedigree may be under media tab)    DISCUSSION:  Non-ischemic cardiomyopathy results from causes other than loss of blood flow to the heart.  Cardiomyopathy can be caused by both acquired and genetic causes.  Acquired causes include exposure to toxins, injury related to coronary artery disease (ischemic), and chronic high blood pressure. When familial, it often results in dilated cardiomyopathy (DCM), where the left ventricle gets enlarged or stretched out.  There is a genetic basis found in 20-50% of DCM cases.     Genetic causes of postpartum cardiomyopathy (PPCM) is an active area of research. Evidence supporting a genetic basis for PPCM and overlap with familial DCM has emerged from observations of familial disease and sequencing of PPCM cohorts. Approximately 20% of PPCM patients screened for cardiomyopathy genes have an identified pathogenic mutation (PMID: 33427592).     Reviewed autosomal dominant (AD) inheritance pattern most commonly associated with DCM, " including the 50% risk for recurrence. Briefly reviewed autosomal recessive and X-linked inheritance. Explained that DCM gene mutations are associated with reduced penetrance and variable expressivity, meaning that individuals who carry a gene mutation may or may not get the disease and onset and severity can vary from one family member to the next. This explains why you may not see cardiomyopathy in each generation of a family.     Genetic testing is indicated for individuals with cardiomyopathy to better understand the cause of their heart disease, progression, the likelihood of noncardiac health risks, availability of gene therapy or enzyme replacement therapies, and risk to relatives. Genetic testing that includes a panel of genes is the most cost effective and timely approach. Reviewed capabilities, limitations, and logistics of testing. Currently over 40 genes have been found to be related to DCM.     DNA sample via saliva or blood is collected and sent to testing lab for evaluation of selected genes. The results could directly impact care and treatment. This testing is medically necessary.     Explained three possible outcomes of genetic testing including: positive identification of a mutation, no mutation identified, and identification of a variant of unknown significance (VUS). If a mutation is identified, presymptomatic testing would be available to at risk family members. If no mutation is identified, it does not rule out the possibility of a genetic component to this disease. Family members could still be at risk for developing the same condition. If a VUS is identified, it is unclear if the mutation is disease causing or just a normal variation. It may take time and possibly additional testing to determine the meaning of a VUS result.     Test results take approximately 2-4 weeks on average. Discussed cost of testing through commercial labs. Explained that the lab works with insurance to determine  coverage and will contact patient if out of pocket costs are expected to exceed $100. If so, patient has the option to pay reported price, cancel testing or elect self pay pricing (typcially around $250).     Discussed pros and cons of genetic testing. Explained that results could determine the cause for dilated cardiomyopathy. If a mutation is identified, presymptomatic testing is available to all at risk relatives. Reviewed possible issues associated with presymptomatic testing including genetic discrimination, current laws to prevent discrimination (ie. YUSUF), insurance issues, and emotional and psychosocial outcomes of testing.     Explained that clinical evaluation is recommended for all first degree relatives (parents, siblings, and children) of an affected individual regardless of decision to pursue genetic testing. Based on the Heart Failure Society of [place] Practice Guidelines (Danish et al, 2018), clinical evaluation should be performed at least every 3-5 years beginning and childhood and should include history, cardiac exam, ECHO, and EKG.    All questions answered at this time.      PLAN: Miriam elected to proceed with genetic testing.  Requisition and consent forms were completed and signed.  DNA will be collected via saliva sample and sent to [facility] laboratory. I will contact patient when results are available.     TOTAL TIME SPENT IN COUNSELIN minutes    Idania Chahal MS, [facility] [place]  Licensed Genetic Counselor  [facility]  [facility]        Please do not hesitate to contact me if you have any questions/concerns.     Sincerely,     Idania Chahal GC

## 2024-11-06 NOTE — PATIENT INSTRUCTIONS
Indication for Genetic Counseling:     Non-ischemic cardiomyopathy results from causes other than loss of blood flow to the heart.  Cardiomyopathy can be caused by both acquired and genetic causes.  Acquired causes include exposure to toxins, injury related to coronary artery disease (ischemic),  infections and inflammation of the heart (myocarditis), alcohol/drug abuse, stress, and chronic high blood pressure.  When familial, it often results in dilated cardiomyopathy (DCM), where the left ventricle gets enlarged or stretched out.  Dilated cardiomyopathy (DCM) is a condition that causes the heart to lose it's elasticity, which leads to stretching and dilation.  It is usually diagnosed by an echocardiogram (ECHO) or cardiac magnetic resonance imaging (MRI).  When the heart becomes dilated, it cannot pump blood as effectively, which can lead to symptoms such as congestive heart failure, fluid accumulation in the body (edema), shortness of breath, fatigue, irregular heartbeat, fainting, stroke, cardiac arrest, and sudden cardiac death (SCD).  There are at least 40 genes known to be associated with DCM.    Genetic causes of postpartum cardiomyopathy (PPCM) is an active area of research. Evidence supporting a genetic basis for PPCM and overlap with familial DCM has emerged from observations of familial disease and sequencing of PPCM cohorts. Approximately 20% of PPCM patients screened for cardiomyopathy genes have an identified pathogenic mutation.    Inheritance:   Humans have over 20,000 genes that instruct our bodies how to function.  We have two copies of each gene because we inherit one from our mother and one from our father.  In most cardiac cases with a genetic component, the condition is inherited in an autosomal dominant (AD) pattern.  This means that in order to have the condition, a person needs to inherit a mutation on one copy of a particular gene.  This mutation or pathogenic variant dominates the  "\"normal\" working copy of the gene.  When an affected individual has children, they can either pass on the \"normal\" copy of the gene or the mutation.  Therefore, children have a 50% chance of inheriting the mutation.  Other family members also have an increased risk but the specific risk depends on the degree of relationship.  Additional inheritance patterns can occur within families and may alter the risk of recurrence.     Testing Options:   Genetic testing is available to assess a panel of genes known to cause this condition.  This test reads through the DNA (sequencing) of these genes to look for spelling mistakes or mutations that could cause the condition.      There are three types of results you could receive from this test.     -Positive result (mutation/pathogenic variant identified) - confirms diagnosis and provides an answer to why this happened.  In addition, identifying a mutation allows family members to have testing to determine their risk.     -Negative result (mutation not identified) - no genetic changes were identified.  This does not rule out a genetic cause for the condition as the genetic testing only identifies 40-50% of genetic causes for this condition.    -Variant of uncertain significance (VUS) - a genetic change was identified, but there is not enough information to determine whether it is disease-causing or normal human genetic variation.     Although genetic testing may identify a mutation, it cannot provide information about the severity of symptoms or the progression of disease.  We cannot predict age of onset or severity of symptoms due to reduced penetrance and variable expressivity.    Logistics:   Genetic testing involves collecting a sample of DNA, thru blood, saliva, or cheek cells.  The sample will be sent to a laboratory to extract the DNA and sequence the genes for mutations.  The laboratory will work with your insurance company to determine the out of pocket (OOP) cost and " will notify you if the OOP cost is greater than $100.  Remember to ask the lab about financial assistance pricing and self pay options as well.  Sometimes those are much lower than insurance pricing.  When testing is initiated, results take about 2-4 weeks to return. I will contact you over the phone when results are available.     Genetic Information and Nondiscrimination Act:  The Genetic Information and Nondiscrimination Act of 2008 (YUSUF) is a federal law that protects individuals from genetic discrimination in health insurance and employment. Genetic discrimination is defined as the misuse of genetic information. This law does not address potential discrimination regarding life insurance or disability insurance.      This is especially relevant for at risk individuals who are considering presymptomatic testing.    Screening Recommendations:  Explained that clinical evaluation is recommended for all first degree relatives (parents, siblings, and children) of an affected individual regardless of decision to pursue genetic testing. Based on the Heart Failure Society of [place] Practice Guidelines (Danish et al, 2009), clinical evaluation should be performed at least every 3-5 years beginning and childhood and should include history, cardiac exam, ECHO, and EKG.    Resources:  Cardiomyopathy  Hypertrophic Cardiomyopathy Association - 4hcm.org  Children's Cardiomyopathy Foundation - childrenscardiomyopathy.org  [place] Cardiomyopathy Association - cardiomyopathy.org  Dilated Cardiomyopathy Foundation - DCMfoundation.org    Arrhythmia  Heart Rhythm Society - HRSonline.org    General   American Heart Association - americanheart.org  Genetics Home Reference - ghr.nlm.nih.gov  Genetic Information and Nondiscrimination Act - ginahelp.org    Contact Information:  Idania Chahal MS, [facility]  Licensed Genetic Counselor  [facility]  [facility]     Office:   [phone]  Appointments:  [phone]  Fax: [phone]  Email: [email or web address]

## 2024-11-12 ENCOUNTER — ANCILLARY PROCEDURE (OUTPATIENT)
Dept: CT IMAGING | Facility: CLINIC | Age: 54
End: 2024-11-12
Attending: CLINICAL NURSE SPECIALIST
Payer: COMMERCIAL

## 2024-11-12 DIAGNOSIS — R91.8 PULMONARY NODULES: ICD-10-CM

## 2024-11-12 PROCEDURE — 71250 CT THORAX DX C-: CPT | Mod: GC | Performed by: RADIOLOGY

## 2024-11-15 DIAGNOSIS — J84.10 GRANULOMATOUS LUNG DISEASE (H): Primary | ICD-10-CM

## 2024-11-18 DIAGNOSIS — J84.10 GRANULOMATOUS LUNG DISEASE (H): Primary | ICD-10-CM

## 2024-11-19 PROCEDURE — 88305 TISSUE EXAM BY PATHOLOGIST: CPT | Mod: 26 | Performed by: PATHOLOGY

## 2024-11-19 PROCEDURE — 88305 TISSUE EXAM BY PATHOLOGIST: CPT | Mod: TC,ORL | Performed by: OBSTETRICS & GYNECOLOGY

## 2024-11-19 PROCEDURE — 88302 TISSUE EXAM BY PATHOLOGIST: CPT | Mod: TC,ORL | Performed by: OBSTETRICS & GYNECOLOGY

## 2024-11-19 PROCEDURE — 88304 TISSUE EXAM BY PATHOLOGIST: CPT | Mod: 26 | Performed by: PATHOLOGY

## 2024-11-20 ENCOUNTER — LAB REQUISITION (OUTPATIENT)
Dept: LAB | Facility: CLINIC | Age: 54
End: 2024-11-20
Payer: COMMERCIAL

## 2024-11-21 LAB
PATH REPORT.COMMENTS IMP SPEC: NORMAL
PATH REPORT.COMMENTS IMP SPEC: NORMAL
PATH REPORT.FINAL DX SPEC: NORMAL
PATH REPORT.GROSS SPEC: NORMAL
PATH REPORT.MICROSCOPIC SPEC OTHER STN: NORMAL
PATH REPORT.RELEVANT HX SPEC: NORMAL
PHOTO IMAGE: NORMAL

## 2024-12-19 ENCOUNTER — ANCILLARY PROCEDURE (OUTPATIENT)
Dept: MRI IMAGING | Facility: CLINIC | Age: 54
End: 2024-12-19
Attending: PHYSICIAN ASSISTANT
Payer: COMMERCIAL

## 2024-12-19 DIAGNOSIS — D32.9 MENINGIOMA (H): ICD-10-CM

## 2024-12-19 PROCEDURE — 70553 MRI BRAIN STEM W/O & W/DYE: CPT | Mod: GC | Performed by: STUDENT IN AN ORGANIZED HEALTH CARE EDUCATION/TRAINING PROGRAM

## 2024-12-19 PROCEDURE — A9585 GADOBUTROL INJECTION: HCPCS | Performed by: STUDENT IN AN ORGANIZED HEALTH CARE EDUCATION/TRAINING PROGRAM

## 2024-12-19 RX ORDER — GADOBUTROL 604.72 MG/ML
8 INJECTION INTRAVENOUS ONCE
Status: COMPLETED | OUTPATIENT
Start: 2024-12-19 | End: 2024-12-19

## 2024-12-19 RX ADMIN — GADOBUTROL 8 ML: 604.72 INJECTION INTRAVENOUS at 13:44

## 2024-12-24 ENCOUNTER — VIRTUAL VISIT (OUTPATIENT)
Dept: NEUROSURGERY | Facility: CLINIC | Age: 54
End: 2024-12-24
Payer: COMMERCIAL

## 2024-12-24 DIAGNOSIS — D32.9 MENINGIOMA (H): Primary | ICD-10-CM

## 2024-12-24 PROCEDURE — 99213 OFFICE O/P EST LOW 20 MIN: CPT | Mod: 95 | Performed by: PHYSICIAN ASSISTANT

## 2024-12-24 NOTE — PROGRESS NOTES
Virtual Visit Details    Type of service:  Video Visit     Originating Location (pt. Location): Home    Distant Location (provider location):  Off-site  Platform used for Video Visit: Clarisonic  Start time: 9:00AM  End time: 9:17AM

## 2024-12-24 NOTE — PATIENT INSTRUCTIONS
Miriam - nice to see you today!  We'll plan on follow up in 1 year, with repeat MRI prior.  Please reach out sooner as needed. Take care!

## 2024-12-24 NOTE — PROGRESS NOTES
[facility]  Department of Neurosurgery  Center for Sk[facility] Base [facility] Pituitary Surgery    Name: Miriam Beverly  MRN: [id number]  Age: 54 year old  : 2024      Chief Complaint:   Left temporal meningioma, follow up visit    History of Present Illness:   Miriam Beverly is a 54 year old female with a history of gestational hypertension with secondary cardiomyopathy and thyroid nodule status post total thyroidectomy who is seen today for annual follow up. Her left temporal meningioma was discovered in  upon workup for new paresthesias of all extremities. Today by video she is doing reasonably well, though has been struggling with fatigue for a few years and presented to the ED yesterday with chest pressure. EKG and workup was reassuring on site. She follows with Cardiology for her history of cardiomyopathy and has an upcoming echocardiogram, had a trace pericardial effusion on last scan. She denies history of seizure, no recent paresthesias or weakness or other new neurologic complaints.     She will have cholesterol rechecked in the new year, LDL was mildly elevated 2024. She is not on cholesterol meds currently.      Review of Systems:   Pertinent items are noted in HPI or as in patient entered ROS below, remainder of complete ROS is negative.     Physical Exam:   Exam today is limited by video visit. Face appears symmetric. Speech is normal.     Imaging:  We reviewed the brain MRI completed 2024 and compared this to her previous imaging revealing a largely stable left temporal meningioma without surrounding FLAIR. No new abnormality seen. Stable T2 hyperintensities noted.      Assessment:  Left temporal meningioma, follow up visit    Plan:  We reviewed the MRI today by video which appears stable. We'll see her back in 1 year with repeat imaging, and she was encouraged to reach out sooner as needed.       Sherley Cox PA-C  Department of Neurosurgery

## 2024-12-24 NOTE — NURSING NOTE
Current patient location: [address] [place] [address] N  [facility] [id number]    Is the patient currently in the state of [place]? YES    Visit mode:VIDEO    If the visit is dropped, the patient can be reconnected by:TELEPHONE VISIT: Phone number:   Telephone Information:   Mobile [phone]       Will anyone else be joining the visit? NO  (If patient encounters technical issues they should call [phone])    Are changes needed to the allergy or medication list? Pt stated no med changes    Are refills needed on medications prescribed by this physician? NO    Rooming Documentation:  Questionnaire(s) completed    Reason for visit: WALLY ALEMANF

## 2025-01-16 ENCOUNTER — THERAPY VISIT (OUTPATIENT)
Dept: PHYSICAL THERAPY | Facility: CLINIC | Age: 55
End: 2025-01-16
Payer: COMMERCIAL

## 2025-01-16 ENCOUNTER — TRANSCRIBE ORDERS (OUTPATIENT)
Dept: OTHER | Age: 55
End: 2025-01-16

## 2025-01-16 DIAGNOSIS — G89.29 CHRONIC BILATERAL LOW BACK PAIN WITHOUT SCIATICA: Primary | ICD-10-CM

## 2025-01-16 DIAGNOSIS — M54.50 CHRONIC BILATERAL LOW BACK PAIN WITHOUT SCIATICA: Primary | ICD-10-CM

## 2025-01-16 DIAGNOSIS — M54.50 LOW BACK PAIN, UNSPECIFIED: Primary | ICD-10-CM

## 2025-01-16 NOTE — PROGRESS NOTES
"PHYSICAL THERAPY EVALUATION  Type of Visit: Evaluation              Subjective         Presenting condition or subjective complaint: Had a cortisone shot and basivetebral nerve ablation at my L4/L5 on 12/31/24  Date of onset: 12/31/24    Relevant medical history:     Dates & types of surgery: Gynecological surgery Nov 2024, Nerve ablation Dec 31 2024    Prior diagnostic imaging/testing results: MRI; X-ray     Prior therapy history for the same diagnosis, illness or injury: Yes Jan 2024 PT    Prior Level of Function  Transfers:   Ambulation:   ADL:   IADL:     Living Environment  Social support: With a significant other or spouse   Type of home: [employer]   Stairs to enter the home: Yes 3 Is there a railing: Yes     Ramp: No   Stairs inside the home: Yes 3 Is there a railing: Yes     Help at home: Self Cares (home health aide/personal care attendant, family, etc)  Equipment owned:       Employment: Yes   Hobbies/Interests:      Patient goals for therapy: Be active and more mobile with far less pain, work out, return to an active social life, enjoy what I used to.    PHYSICAL THERAPY LUMBAR EVALUATION    SUBJECTIVE:  Miriam is a 54 year old female who reports that for the last six months, everything was painful.  Coughing, missteps, sneezing became terribly painful.  She had her ablation.  She is definitely better but not painfree.  She still has pain with sneezing, coughing and prolonged sitting, but it's definitely improved.  She did find working from home sitting on the couch was \"painfree\", but sitting in her office chair the next day \"Ruined her back\".  Overall, she still has a lot of stiffness in the morning. She reports no pain down the leg and pain localized to the low back.    Patient reports symptoms of:  Pain and Stiffness / Tightness    Patient report of Pain:  Pain Rating Now: 2/10  Pain Rating at Best: 0/10  Pain Rating at Worst: 8/10  Pain Location: Low Back Bilaterally  Pain " Quality/Description: Sometimes it's sharp pain, Sometimes it's constant dull ache  Pain Better with: Certain Positions  Pain Worse with: Jumping, Coughing, Sneezing  Progression of Symptoms: Improved    Patient reports Red Flags symptoms of:  None    OBJECTIVE:  Seated Lower Extremity Manual Muscle Test / Myotome Assessment:  Hip Flexion (L2): Right Leg 5/5, Left Leg 5/5  Knee Extension (L3): Right Leg 5/5, Left Leg 5/5  Ankle Dorsiflexion (L4): Right Leg 5/5, Left Leg 5/5  Great Toe (L5): Right Leg 5/5, Left Leg 5/5  Ankle Plantarflexion (S1): Right Leg 5/5, Left Leg 5/5  Knee Flexion (S2): Right Leg 5/5, Left Leg 5/5    Seated Neural Tension Assessment:   Slump Test: Right Leg: Mild tension with increased back tightness during dorsiflexion, Left Leg: Mild tension with increased back tightness during dorsiflexion    Standing Lumbar Active ROM:  Standing Lumbar Flexion (Hands slide down thighs): Fingertips to Toes  Standing Lumbar Extension: No Restriction and WNL      ASSESSMENT:  Miriam is a 54 year old female referred to Physical Therapy for Chronic Bilateral Low Back Pain without Sciatica   from [facility].  Miriam demonstrates findings of Pain, Weakness, and Loss of Function that justify a need for formal Physical Therapy. These impairments interfere with their ability to perform self care tasks, work tasks, recreational activities, household chores, driving , household mobility, and community mobility as compared to their previous level of function.    Medical Diagnosis: Chronic Bilateral Low Back Pain without Sciatica    Treatment Diagnosis: Chronic Bilateral Low Back Pain without Sciatica     Clinical Decision Making (Complexity):  Clinical Presentation: Stable/Uncomplicated  Clinical Presentation Rationale: based on medical and personal factors listed in PT evaluation  Clinical Decision Making (Complexity): Low complexity      PHYSICAL THERAPY PLAN OF CARE:  Treatment Interventions:  Modalities:  Cryotherapy, Dry Needling, E-stim, Hot Pack, Mechanical Traction, Ultrasound  Interventions: Gait Training, Manual Therapy, Neuromuscular Re-education, Therapeutic Activity, Therapeutic Exercise    Long Term Goals     PT Goal 1  Goal Identifier: Coughing and Sneezing  Goal Description: Miriam will be able to Cough, Sneeze and withstand basic human bodily functions without low back pain  Rationale: to maximize safety and independence with performance of ADLs and functional tasks;to maximize safety and independence within the home;to maximize safety and independence within the community;to maximize safety and independence with self cares;to maximize safety and independence with transportation  Goal Progress: Currently gets 5/10 or worse pain with coughing / sneezing  Target Date: 03/13/25  PT Goal 2  Goal Identifier: Running, Jumping and Increased impact  Goal Description: Miriam will be able to run/jog at 4.0 mph, light jumping and be able to step onto / off of steps  Rationale: to maximize safety and independence with performance of ADLs and functional tasks;to maximize safety and independence within the home;to maximize safety and independence within the community;to maximize safety and independence with transportation;to maximize safety and independence with self cares  Goal Progress: Pain with stepping off of steps or light low impact hopping  Target Date: 04/10/25    Frequency of Treatment: 1x a week  Duration of Treatment: 12 weeks         Risks and benefits of evaluation/treatment have been explained.   Patient/Family/caregiver agrees with Plan of Care.      Evaluation Time:     PT Eval, Low Complexity Minutes (84618): 15         Signing Clinician: Hal Peterson PT        SUMMARY OF PLAN OF CARE:  Miriam presents post ablation performed at [facility].  Her symptoms have greatly improved with no radicular symptoms and now localized back pain bilaterally.  Given she has not radicular symptoms and minimal  response to MDT principles previously, we plan on focusing primarily on strengthening and mobility.  Our goal is to start with a basic mobility and glute strengthening program and expanding it to a multi-day program including glute strengthening one day, core strengthening one day, lower extremity strengthening one day and addressing every aspect of her strength and mobility to get her returning to her prior level of function

## 2025-01-22 ENCOUNTER — THERAPY VISIT (OUTPATIENT)
Dept: PHYSICAL THERAPY | Facility: CLINIC | Age: 55
End: 2025-01-22
Payer: COMMERCIAL

## 2025-01-22 DIAGNOSIS — G89.29 CHRONIC BILATERAL LOW BACK PAIN WITHOUT SCIATICA: Primary | ICD-10-CM

## 2025-01-22 DIAGNOSIS — M54.50 CHRONIC BILATERAL LOW BACK PAIN WITHOUT SCIATICA: Primary | ICD-10-CM

## 2025-01-22 PROCEDURE — 97110 THERAPEUTIC EXERCISES: CPT | Mod: GP

## 2025-02-10 ENCOUNTER — THERAPY VISIT (OUTPATIENT)
Dept: PHYSICAL THERAPY | Facility: CLINIC | Age: 55
End: 2025-02-10
Payer: COMMERCIAL

## 2025-02-10 DIAGNOSIS — M54.50 CHRONIC BILATERAL LOW BACK PAIN WITHOUT SCIATICA: Primary | ICD-10-CM

## 2025-02-10 DIAGNOSIS — G89.29 CHRONIC BILATERAL LOW BACK PAIN WITHOUT SCIATICA: Primary | ICD-10-CM

## 2025-02-10 PROCEDURE — 97110 THERAPEUTIC EXERCISES: CPT | Mod: GP

## 2025-02-10 PROCEDURE — 97530 THERAPEUTIC ACTIVITIES: CPT | Mod: GP

## 2025-02-22 ENCOUNTER — HEALTH MAINTENANCE LETTER (OUTPATIENT)
Age: 55
End: 2025-02-22

## 2025-03-13 ENCOUNTER — THERAPY VISIT (OUTPATIENT)
Dept: PHYSICAL THERAPY | Facility: CLINIC | Age: 55
End: 2025-03-13
Payer: COMMERCIAL

## 2025-03-13 DIAGNOSIS — G89.29 CHRONIC BILATERAL LOW BACK PAIN WITHOUT SCIATICA: Primary | ICD-10-CM

## 2025-03-13 DIAGNOSIS — M54.50 CHRONIC BILATERAL LOW BACK PAIN WITHOUT SCIATICA: Primary | ICD-10-CM

## 2025-03-13 NOTE — PROGRESS NOTES
"   03/13/25 0500   Appointment Info   Signing clinician's name / credentials Hal Peterson, PT, DPT, CSCS, CLT   Total/Authorized Visits E&T   Visits Used 4   Medical Diagnosis Chronic Bilateral Low Back Pain without Sciatica   PT Tx Diagnosis Chronic Bilateral Low Back Pain without Sciatica   Progress Note/Certification   Onset of illness/injury or Date of Surgery 12/31/24   Therapy Frequency 1x a week   Predicted Duration 12 weeks   Progress Note Due Date 04/10/25   Progress Note Completed Date 01/16/25   GOALS   PT Goals 2   PT Goal 1   Goal Identifier Coughing and Sneezing   Goal Description Miriam will be able to Cough, Sneeze and withstand basic human bodily functions without low back pain   Rationale to maximize safety and independence with performance of ADLs and functional tasks;to maximize safety and independence within the home;to maximize safety and independence within the community;to maximize safety and independence with self cares;to maximize safety and independence with transportation   Goal Progress Depends on if she has been active or not.  If she has been sitting for prolonged period, the coughing and sneezing is actually worse or more painful.  She reports one instance last week where a sneeze caused \"The worse pain of her life\"   Target Date 03/13/25   PT Goal 2   Goal Identifier Running, Jumping and Increased impact   Goal Description Miriam will be able to run/jog at 4.0 mph, light jumping and be able to step onto / off of steps   Rationale to maximize safety and independence with performance of ADLs and functional tasks;to maximize safety and independence within the home;to maximize safety and independence within the community;to maximize safety and independence with transportation;to maximize safety and independence with self cares   Goal Progress She does not run or job.  There are still weird instances correlated with increased activity or decreased activity where stepping off a curb " "causes pain.   Target Date 04/10/25   Subjective Report   Subjective Report She has been doing a lot of desk work recently.  The days she is moving more and doing more exercise, she feels \"A lot better\" with  minmal pain.  However, one day being stuck at her desk will cause her to be \"Wrecked\" for the next day. There is a clear different between sedentary days and activity days.  She found that if she moves quite a bit, she can also do more like carry heavy stuff without pain.  She feels limited progress because she has been forced to be doing multiple desk tasks at work.  Sitting on cushioned surfaces feel much better than sitting on firm surface   Objective Measures   Objective Measures Objective Measure 2;Objective Measure 1;Objective Measure 3;Objective Measure 4   Objective Measure 1   Objective Measure Pain   Details 3-4/10 on Arrival   Objective Measure 2   Objective Measure Lumbar ROM   Details Flexion: Finger tips to top of foot (Normally she can touch the floor). Extension: Full, Lumbar Rotation: Full, Side Bend Full   Objective Measure 3   Objective Measure OMAYRA / Cali   Details OMAYRA: 8 (17.78%), Cali: 4 (Medium)   Treatment Interventions (PT)   Interventions Therapeutic Procedure/Exercise;Therapeutic Activity   Therapeutic Procedure/Exercise   Therapeutic Procedures: strength, endurance, ROM, flexibility minutes (95801) 24   Ther Proc 1 Bike   Ther Proc 1 - Details 6 minutes at level 6 resistance   PTRx Ther Proc 1 All 4s Cat Cow   PTRx Ther Proc 1 - Details 1x15 each direction   PTRx Ther Proc 2 Thread the Needle   PTRx Ther Proc 2 - Details 1x15 each direction   PTRx Ther Proc 3 Prone Press Ups   PTRx Ther Proc 3 - Details 1x10   PTRx Ther Proc 4 Extended Oscar Pose   PTRx Ther Proc 4 - Details Reviewed   PTRx Ther Proc 5 Single Knee to Chest   PTRx Ther Proc 5 - Details 1x20 second holds each side   PTRx Ther Proc 6 Pretzel Stretch   PTRx Ther Proc 6 - Details 1x30 seconds   PTRx Ther Proc 7 Lumbar " Flexion Rotation   PTRx Ther Proc 7 - Details Reviewed   Skilled Intervention Mobility HEP   Patient Response/Progress Well tolerated. Good for warm up   Therapeutic Activity   Therapeutic Activities: dynamic activities to improve functional performance minutes (43823) 15   PTRx Ther Act 1 Wall Sit with Ball   PTRx Ther Act 1 - Details Leg Press Squats: 3x15 with 50 lbs added   PTRx Ther Act 2 Dumbbell Japanese Dead Lift - Bilateral   PTRx Ther Act 2 - Details 3x15 with two 17.5 lbs   PTRx Ther Act 3 Shoulder Press Dumbells Bilateral   PTRx Ther Act 3 - Details Reviewed   PTRx Ther Act 4 Kettlebell Pendlay Row   PTRx Ther Act 4 - Details Reviewed   Skilled Intervention Functional Lifting HEP   Patient Response/Progress Well tolerated. Does better with activity   Plan   Home program See PTRx   Updates to plan of care Continue per POC   Plan for next session Discharging and transitioning to indpeendent management   Total Session Time   Timed Code Treatment Minutes 39   Total Treatment Time (sum of timed and untimed services) 39         DISCHARGE  Reason for Discharge: Patient chooses to discontinue therapy. She is doing far better in terms of OMAYRA and Mobility. She found the biggest correlation to her current back pain is her level of activity. More activity is far less painful while more sitting/sedentary is more painful.  She is experienced exercise and is comfortable managing independently without the guidance of formal physical therapy.    Equipment Issued: None    Discharge Plan: Patient to continue home program.    Referring Provider:  Referred Self

## 2025-05-20 PROCEDURE — 88307 TISSUE EXAM BY PATHOLOGIST: CPT | Mod: TC,ORL | Performed by: OBSTETRICS & GYNECOLOGY

## 2025-05-20 PROCEDURE — 88307 TISSUE EXAM BY PATHOLOGIST: CPT | Mod: 26 | Performed by: STUDENT IN AN ORGANIZED HEALTH CARE EDUCATION/TRAINING PROGRAM

## 2025-05-21 ENCOUNTER — LAB REQUISITION (OUTPATIENT)
Dept: LAB | Facility: CLINIC | Age: 55
End: 2025-05-21
Payer: COMMERCIAL

## 2025-06-19 DIAGNOSIS — E89.0 POSTOPERATIVE HYPOTHYROIDISM: ICD-10-CM

## 2025-06-23 RX ORDER — LEVOTHYROXINE SODIUM 100 UG/1
100 TABLET ORAL DAILY
Qty: 90 TABLET | Refills: 4 | OUTPATIENT
Start: 2025-06-23

## 2025-06-23 NOTE — TELEPHONE ENCOUNTER
"Last Written Prescription:   Disp Refills Start End ARABELLA   levothyroxine (SYNTHROID/LEVOTHROID) 100 MCG tablet 90 tablet 4 5/22/2024 -- No   Sig - Route: Take 1 tablet (100 mcg) by mouth daily - Oral     ----------------------  Last Visit Date: 9/26/2023  [facility] [place]      Future Visit Date: none  ----------------------  Per last visit note:  \"  Reduce levothyroxine to 112 mcg/day .  Repeat labs in 3 months.       Labs late December     Once we get the thyroid stabilized we will send you back to you primary care provider for long term follow up       \"      Refill decision: Medication unable to be refilled by RN due to: Other:    Review needed - is patients outside PCP to manage the levothyroxine?    Request from pharmacy:  Requested Prescriptions   Pending Prescriptions Disp Refills    levothyroxine (SYNTHROID/LEVOTHROID) 100 MCG tablet 90 tablet 4     Sig: Take 1 tablet (100 mcg) by mouth daily.       Thyroid Protocol Failed - 6/23/2025 11:52 AM        Failed - Recent (12 month) or future (90 days) visit with authorizing provider's specialty (provided they have been seen in the past 15 months)     The patient must have completed an in-person or virtual visit within the past 12 months or has a future visit scheduled within the next 90 days with the authorizing provider s specialty.  Urgent care and e-visits do not qualify as an office visit for this protocol.          Passed - Patient is 12 years or older        Passed - Medication is active on med list and the sig matches. RN to manually verify dose and sig if red X/fail.     If the protocol passes (green check), you do not need to verify med dose and sig.    A prescription matches if they are the same clinical intention.    For Example: once daily and every morning are the same.    The protocol can not identify upper and lower case letters as matching and will fail.     For Example: Take 1 tablet (50 mg) by mouth daily "     TAKE 1 TABLET (50 MG) BY MOUTH DAILY    For all fails (red x), verify dose and sig.    If the refill does match what is on file, the RN can still proceed to approve the refill request.       If they do not match, route to the appropriate provider.             Passed - Medication indicated for associated diagnosis     Medication is associated with one or more of the following diagnoses:  Hypothyroidism  Thyroid stimulating hormone suppression therapy  Thyroid cancer  Acquired atrophy of thyroid          Passed - Normal TSH on file in past 12 months     Recent Labs   Lab Test 08/08/24  1258   TSH 0.89              Passed - No active pregnancy on record     If patient is pregnant or has had a positive pregnancy test, please check TSH.          Passed - No positive pregnancy test in past 12 months     If patient is pregnant or has had a positive pregnancy test, please check TSH.

## 2025-08-01 ENCOUNTER — MYC MEDICAL ADVICE (OUTPATIENT)
Dept: PULMONOLOGY | Facility: CLINIC | Age: 55
End: 2025-08-01
Payer: COMMERCIAL

## (undated) DEVICE — SURGICEL HEMOSTAT 4X8" 1952

## (undated) DEVICE — ESU CORD BIPOLAR GREEN 10-4000

## (undated) DEVICE — CLIP HORIZON MED BLUE 002200

## (undated) DEVICE — CLIP HORIZON SM RED WIDE SLOT 001201

## (undated) DEVICE — SU SILK 2-0 TIE 12X30" A305H

## (undated) DEVICE — PREP SKIN SCRUB TRAY 4461A

## (undated) DEVICE — SUCTION MANIFOLD NEPTUNE 2 SYS 4 PORT 0702-020-000

## (undated) DEVICE — DRSG TEGADERM 4X4 3/4" 1626W

## (undated) DEVICE — SPONGE LAP 18X18" X8435

## (undated) DEVICE — SUCTION SLEEVE NEPTUNE 2 125MM 0703-005-125

## (undated) DEVICE — SU SILK 2-0 SH CR 5X18" C0125

## (undated) DEVICE — SU SILK 3-0 TIE 12X30" A304H

## (undated) DEVICE — DRAIN JACKSON PRATT 07MM FLAT SU130-1310

## (undated) DEVICE — RETR ELASTIC STAYS LONE STAR BLUNT SGL PK 3350-1G

## (undated) DEVICE — PREP POVIDONE IODINE SOLUTION 10% 4OZ BOTTLE 29906-004

## (undated) DEVICE — PACK NEURO MINOR UMMC SNE32MNMU4

## (undated) DEVICE — SU VICRYL 3-0 SH 8X18" UND J864D

## (undated) DEVICE — LINEN GOWN LG 5406

## (undated) DEVICE — SPONGE KITTNER 30-101

## (undated) DEVICE — LINEN TOWEL PACK X6 WHITE 5487

## (undated) DEVICE — ESU GROUND PAD ADULT W/CORD E7507

## (undated) DEVICE — Device

## (undated) DEVICE — SU ETHILON 3-0 PS-1 18" 1663H

## (undated) DEVICE — ADH SKIN CLOSURE PREMIERPRO EXOFIN 1.0ML 3470

## (undated) DEVICE — NIM PROBE PRASS INCREMENTING TIP 8225825

## (undated) DEVICE — SPONGE RAY-TEC 4X8" 7318

## (undated) DEVICE — SU MONOCRYL 4-0 PS-2 18" UND Y496G

## (undated) DEVICE — DECANTER VIAL 2006S

## (undated) DEVICE — LINEN TOWEL PACK X30 5481

## (undated) DEVICE — BLADE KNIFE SURG 15 371115

## (undated) DEVICE — DRAIN JACKSON PRATT RESERVOIR 100ML SU130-1305

## (undated) DEVICE — STPL SKIN 35W ROTATING HEAD PRW35

## (undated) RX ORDER — LIDOCAINE HYDROCHLORIDE 20 MG/ML
INJECTION, SOLUTION EPIDURAL; INFILTRATION; INTRACAUDAL; PERINEURAL
Status: DISPENSED
Start: 2022-08-18

## (undated) RX ORDER — ONDANSETRON 2 MG/ML
INJECTION INTRAMUSCULAR; INTRAVENOUS
Status: DISPENSED
Start: 2022-08-18

## (undated) RX ORDER — FENTANYL CITRATE 50 UG/ML
INJECTION, SOLUTION INTRAMUSCULAR; INTRAVENOUS
Status: DISPENSED
Start: 2022-08-18

## (undated) RX ORDER — PROPOFOL 10 MG/ML
INJECTION, EMULSION INTRAVENOUS
Status: DISPENSED
Start: 2022-08-18

## (undated) RX ORDER — CEFAZOLIN SODIUM/WATER 2 G/20 ML
SYRINGE (ML) INTRAVENOUS
Status: DISPENSED
Start: 2022-08-18

## (undated) RX ORDER — HYDROMORPHONE HCL IN WATER/PF 6 MG/30 ML
PATIENT CONTROLLED ANALGESIA SYRINGE INTRAVENOUS
Status: DISPENSED
Start: 2022-08-18

## (undated) RX ORDER — FENTANYL CITRATE-0.9 % NACL/PF 10 MCG/ML
PLASTIC BAG, INJECTION (ML) INTRAVENOUS
Status: DISPENSED
Start: 2022-08-18

## (undated) RX ORDER — DEXAMETHASONE SODIUM PHOSPHATE 4 MG/ML
INJECTION, SOLUTION INTRA-ARTICULAR; INTRALESIONAL; INTRAMUSCULAR; INTRAVENOUS; SOFT TISSUE
Status: DISPENSED
Start: 2022-08-18

## (undated) RX ORDER — EPHEDRINE SULFATE 50 MG/ML
INJECTION, SOLUTION INTRAMUSCULAR; INTRAVENOUS; SUBCUTANEOUS
Status: DISPENSED
Start: 2022-08-18

## (undated) RX ORDER — HYDROMORPHONE HYDROCHLORIDE 1 MG/ML
INJECTION, SOLUTION INTRAMUSCULAR; INTRAVENOUS; SUBCUTANEOUS
Status: DISPENSED
Start: 2022-08-18

## (undated) RX ORDER — DEXAMETHASONE SODIUM PHOSPHATE 10 MG/ML
INJECTION, SOLUTION INTRAMUSCULAR; INTRAVENOUS
Status: DISPENSED
Start: 2022-08-18

## (undated) RX ORDER — OXYCODONE HYDROCHLORIDE 5 MG/1
TABLET ORAL
Status: DISPENSED
Start: 2022-08-18

## (undated) RX ORDER — LIDOCAINE HYDROCHLORIDE AND EPINEPHRINE 10; 10 MG/ML; UG/ML
INJECTION, SOLUTION INFILTRATION; PERINEURAL
Status: DISPENSED
Start: 2022-08-18